# Patient Record
Sex: MALE | Race: WHITE | HISPANIC OR LATINO | Employment: OTHER | URBAN - METROPOLITAN AREA
[De-identification: names, ages, dates, MRNs, and addresses within clinical notes are randomized per-mention and may not be internally consistent; named-entity substitution may affect disease eponyms.]

---

## 2020-04-17 ENCOUNTER — TRANSFERRED RECORDS (OUTPATIENT)
Dept: MULTI SPECIALTY CLINIC | Facility: CLINIC | Age: 60
End: 2020-04-17

## 2020-04-17 LAB — HEMOCCULT STL QL IA: NEGATIVE

## 2020-12-04 ENCOUNTER — VIRTUAL VISIT (OUTPATIENT)
Dept: FAMILY MEDICINE | Facility: CLINIC | Age: 60
End: 2020-12-04
Payer: COMMERCIAL

## 2020-12-04 DIAGNOSIS — E78.5 HYPERLIPIDEMIA LDL GOAL <70: ICD-10-CM

## 2020-12-04 DIAGNOSIS — Z00.00 HEALTHCARE MAINTENANCE: ICD-10-CM

## 2020-12-04 DIAGNOSIS — E03.9 HYPOTHYROIDISM, UNSPECIFIED TYPE: Primary | ICD-10-CM

## 2020-12-04 PROBLEM — I25.10 CAD (CORONARY ARTERY DISEASE): Status: ACTIVE | Noted: 2020-12-04

## 2020-12-04 PROCEDURE — 99203 OFFICE O/P NEW LOW 30 MIN: CPT | Mod: 95 | Performed by: STUDENT IN AN ORGANIZED HEALTH CARE EDUCATION/TRAINING PROGRAM

## 2020-12-04 RX ORDER — CYCLOBENZAPRINE HCL 10 MG
TABLET ORAL
COMMUNITY
Start: 2020-11-05 | End: 2020-12-04

## 2020-12-04 RX ORDER — DIAZEPAM 5 MG
TABLET ORAL
COMMUNITY
Start: 2020-11-22 | End: 2021-12-06

## 2020-12-04 RX ORDER — ATORVASTATIN CALCIUM 40 MG/1
40 TABLET, FILM COATED ORAL DAILY
Qty: 60 TABLET | Refills: 0 | Status: SHIPPED | OUTPATIENT
Start: 2020-12-04 | End: 2021-02-26

## 2020-12-04 RX ORDER — ATORVASTATIN CALCIUM 80 MG/1
80 TABLET, FILM COATED ORAL
COMMUNITY
Start: 2020-05-05 | End: 2021-12-06

## 2020-12-04 RX ORDER — LEVOTHYROXINE SODIUM 112 UG/1
112 TABLET ORAL
COMMUNITY
Start: 2020-10-24 | End: 2020-12-04

## 2020-12-04 RX ORDER — LEVOTHYROXINE SODIUM 112 UG/1
112 TABLET ORAL DAILY
Qty: 90 TABLET | Refills: 3 | Status: SHIPPED | OUTPATIENT
Start: 2020-12-04 | End: 2022-02-07

## 2020-12-04 RX ORDER — ASPIRIN 81 MG/1
81 TABLET, CHEWABLE ORAL
COMMUNITY
Start: 2020-02-11 | End: 2023-03-15

## 2020-12-04 RX ORDER — IBUPROFEN 200 MG
200-400 TABLET ORAL
COMMUNITY

## 2020-12-04 SDOH — HEALTH STABILITY: MENTAL HEALTH: HOW OFTEN DO YOU HAVE 6 OR MORE DRINKS ON ONE OCCASION?: NOT ASKED

## 2020-12-04 SDOH — HEALTH STABILITY: MENTAL HEALTH: HOW MANY STANDARD DRINKS CONTAINING ALCOHOL DO YOU HAVE ON A TYPICAL DAY?: NOT ASKED

## 2020-12-04 SDOH — HEALTH STABILITY: MENTAL HEALTH: HOW OFTEN DO YOU HAVE A DRINK CONTAINING ALCOHOL?: NOT ASKED

## 2020-12-04 NOTE — PROGRESS NOTES
"Family Medicine Video Visit Note  Steve is being evaluated via a billable video visit.    Patient was verbally read the following and verbal consent was obtained.    \"Telephone visits are billed at different rates depending on your insurance coverage. During this emergency period, for some insurers they may be billed the same as an in-person visit.  Please reach out to your insurance provider with any questions.  If during the course of the call the physician/provider feels a telephone visit is not appropriate, you will not be charged for this service.\"    Name person giving consent:  Patient   Date verbal consent given:  12/4/2020  Time verbal consent given:  9:15 AM    Chief Complaint   Patient presents with     Establish Care     Pt is new to our clinic and would like to establish care today.     Medication Reconciliation     Complete.             HPI     Video Start Time: 9:18 AM    Steve presents to clinic today for the following health issues:    New Patient/Transfer of Care. Steve previously followed regularly with [facility] but is seeking a new provider due to a change in his insurance. Steve previously followed with Dermatology and Cardiology through [facility] and would like to transfer to [facility] specialty services. Steve's medical hisory is outlined below.     Medical History    Asymmetric sensorineural hearing loss (right sided)    Osteopenia (Dexa 1/2019; T score lumbar spine -0.2, L femoral neck -2.4, L hip -1.1; F/U in 3-5 yrs)    Hyperlipidemia     Hypothyroidism    Situational anxiety    Hepatitis C - s/p pegasys and rebatol tx in early 2000s    Environmental allergies    Actinic Keratosis     Surgical History    Ortho surgeries; R metacarpal fx manip (1997), Bilateral shoulder arthoscopy (1997,1999), R forearm fracture ORIF (1979,1980)     Meniscectomy, bilateral (1978,2000)     Perforated duodenal ulcer repair (1981)    Parotid mass excision (2006)     Septoturbinoplasty (2017) " "    Family History    Maternal uncle with prostate cancer    Hypothyroidism in brother and sister    No diabetes, CAD, stroke, or colon cancer    Mother had a heart valve replaced    Social History     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    Recreational drugs: >20 years sober    Smoking: quit smoking (remote 20 pack year hx)     , no children    Allergies    Seasonal allergies, Animal (cats horses)    Sulfa drugs and hydrocodone noted in chart, pt denied medication allergies     Current Outpatient Medications   Medication Sig Dispense Refill     aspirin (ASA) 81 MG chewable tablet Take 81 mg by mouth       atorvastatin (LIPITOR) 80 MG tablet Take 80 mg by mouth       diazepam (VALIUM) 5 MG tablet TAKE 1 TABLET BY MOUTH TWICE DAILY AS NEEDED FOR FLIGHT ANXIETY       levothyroxine (SYNTHROID/LEVOTHROID) 112 MCG tablet Take 112 mcg by mouth       cyclobenzaprine (FLEXERIL) 10 MG tablet TK 1 T PO TID FOR 5 DAYS       ibuprofen (ADVIL/MOTRIN) 200 MG tablet Take 200-400 mg by mouth       Allergies   Allergen Reactions     Sulfa Drugs Rash     Sulfanilamide Rash     Doctor believes he was allergic to topical ointment as a teenager ?sulfa containing        Food      PN: LW FI1: nka     Hydrocodone-Acetaminophen Other (See Comments)     No Clinical Screening - See Comments      PN: LW CM1: CONTRAST- nka Reaction :              Review of Systems:     Constitutional, HEENT, cardiovascular, pulmonary, gi and gu systems are negative, except as otherwise noted.         Physical Exam:     There were no vitals taken for this visit.  Estimated body mass index is 22.15 kg/m  as calculated from the following:    Height as of 10/12/13: 1.753 m (5' 9\").    Weight as of 10/12/13: 68 kg (150 lb).    GENERAL: Healthy, alert and no distress  EYES: Eyes grossly normal to inspection.  No discharge or erythema, or obvious scleral/conjunctival abnormalities.  RESP: No audible wheeze, cough, or visible cyanosis.  No visible retractions or increased " work of breathing.    SKIN: Visible skin clear. No significant rash, abnormal pigmentation or lesions.  NEURO: Cranial nerves grossly intact.  Mentation and speech appropriate for age.  PSYCH: Mentation appears normal, affect normal/bright, judgement and insight intact, normal speech and appearance well-groomed.    OSH results recent (6/3/20)  CHOLESTEROL,TOTAL    124   TRIGLYCERIDES  76   HDL CHOLESTEROL  69   NON-HDL CHOLESTEROL  55   CHOL/HDL RATIO           1.8     LDL CHOLESTEROL  40               Assessment and Plan   Steve was seen today for establish care and medication reconciliation. Pt history is outlined above. Chart uptaded. Pt main issues discussed include hypothyroidism (has been stable treated with 112mcg daily), which pt would like long term prescription for. Will plan to check thyroid cascade at next visit. Hyperlipidemia: given very low LDL and total cholesterol at 124, pt inquiring about lowering lipitor to 40mg. Will trial lower dose and follow up lipid panel at next visit. Pt also requesting derm referral for ongoing skin checks and sun damaged skin.     Diagnoses and all orders for this visit:    Hypothyroidism, unspecified type  -     levothyroxine (SYNTHROID/LEVOTHROID) 112 MCG tablet; Take 1 tablet (112 mcg) by mouth daily  -     Thyroid [place] ([facility]); Future    Hyperlipidemia LDL goal <70  -     atorvastatin (LIPITOR) 40 MG tablet; Take 1 tablet (40 mg) by mouth daily  -     Lipid Panel ([place]); Future    Healthcare maintenance  -     DERMATOLOGY ADULT REFERRAL; Future        Refilled medications that would be required in the next 3 months. Follow up on 1/29 for annual physical.     After Visit Information:  Patient declined AVS       No follow-ups on file.      Video-Visit Details    Type of service:  Video Visit    Video End Time (time video stopped): 9:45 AM    Originating Location (pt. Location): Home    Distant Location (provider location):  [facility]  [place]     Platform used for Video Visit: Moses Holloway MD  I precepted today with Jair Borrero MD

## 2020-12-04 NOTE — PROGRESS NOTES
Preceptor Attestation:   Patient seen and evaluated via video visit. I discussed the patient with the resident. I have verified the content of the note, which accurately reflects my assessment of the patient and the plan of care.   Supervising Physician:  Jair Borrero MD.

## 2020-12-08 ENCOUNTER — TELEPHONE (OUTPATIENT)
Dept: FAMILY MEDICINE | Facility: CLINIC | Age: 60
End: 2020-12-08

## 2020-12-08 NOTE — TELEPHONE ENCOUNTER
----- Message from Tato Holloway MD sent at 12/4/2020 10:00 AM CST -----  Regarding: Lab only  Hello,     We'd like to get Steve here scheduled for a lab only visit in the morning some time in January for fasting lipids. Future orders were placed. Please call him to schedule lab only draw if necessary.     Thank you ,   Tato

## 2021-01-08 DIAGNOSIS — E78.5 HYPERLIPIDEMIA LDL GOAL <70: ICD-10-CM

## 2021-01-08 DIAGNOSIS — Z00.00 HEALTHCARE MAINTENANCE: Primary | ICD-10-CM

## 2021-01-08 DIAGNOSIS — E03.9 HYPOTHYROIDISM, UNSPECIFIED TYPE: ICD-10-CM

## 2021-01-08 LAB
CHOLEST SERPL-MCNC: 132.6 MG/DL (ref 0–200)
CHOLEST/HDLC SERPL: 1.8 {RATIO} (ref 0–5)
HDLC SERPL-MCNC: 71.7 MG/DL
LDLC SERPL CALC-MCNC: 45 MG/DL (ref 0–129)
PSA SERPL-MCNC: 0.7 NG/ML (ref 0–4.5)
TRIGL SERPL-MCNC: 80.9 MG/DL (ref 0–150)
TSH SERPL DL<=0.05 MIU/L-ACNC: 1.66 UIU/ML (ref 0.3–5)
VLDL CHOLESTEROL: 16.2 MG/DL (ref 7–32)

## 2021-01-08 PROCEDURE — 80061 LIPID PANEL: CPT

## 2021-01-08 PROCEDURE — 36415 COLL VENOUS BLD VENIPUNCTURE: CPT

## 2021-01-08 NOTE — PROGRESS NOTES
"Add on lab today of a PSA per pt request due to \"enlarged prostate\". I called pt and had a risk/benefit discussion with him and pt decided he would like to go forward with PSA screening.    Tato Holloway MD PGY2  [facility]     "

## 2021-01-10 ENCOUNTER — HEALTH MAINTENANCE LETTER (OUTPATIENT)
Age: 61
End: 2021-01-10

## 2021-01-30 ENCOUNTER — ANCILLARY PROCEDURE (OUTPATIENT)
Dept: GENERAL RADIOLOGY | Facility: CLINIC | Age: 61
End: 2021-01-30
Attending: FAMILY MEDICINE
Payer: COMMERCIAL

## 2021-01-30 ENCOUNTER — OFFICE VISIT (OUTPATIENT)
Dept: URGENT CARE | Facility: URGENT CARE | Age: 61
End: 2021-01-30
Payer: COMMERCIAL

## 2021-01-30 VITALS
WEIGHT: 150 LBS | OXYGEN SATURATION: 98 % | SYSTOLIC BLOOD PRESSURE: 150 MMHG | DIASTOLIC BLOOD PRESSURE: 80 MMHG | HEIGHT: 69 IN | BODY MASS INDEX: 22.22 KG/M2 | HEART RATE: 73 BPM

## 2021-01-30 DIAGNOSIS — M79.671 RIGHT FOOT PAIN: Primary | ICD-10-CM

## 2021-01-30 DIAGNOSIS — Q74.2 ACCESSORY BONE OF FOOT: ICD-10-CM

## 2021-01-30 DIAGNOSIS — M79.671 RIGHT FOOT PAIN: ICD-10-CM

## 2021-01-30 PROCEDURE — 99203 OFFICE O/P NEW LOW 30 MIN: CPT | Performed by: FAMILY MEDICINE

## 2021-01-30 PROCEDURE — 73630 X-RAY EXAM OF FOOT: CPT | Mod: RT | Performed by: RADIOLOGY

## 2021-01-30 ASSESSMENT — MIFFLIN-ST. JEOR: SCORE: 1470.78

## 2021-01-30 NOTE — PROGRESS NOTES
"Subjective:   Steve Jin is a 60 year old male who presents for   Chief Complaint   Patient presents with     Urgent Care     Pt in clinic to have eval for right foot pain.     Musculoskeletal Problem       Right foot pain starting about 4 days ago-- walking his dogs he feels the pain on the right lateral foot.     No previous foot fractures.   Aggravating factors: placing weight  Rates his pain  Moderate when applying weight at rest about 4/10    No hx of plantar fasciitis    2 weeks ago jumped off a rock in [place] but there was no pain at that  Time or the days after    He's a very active individual and does cross country skiing      Hx of ankle fracture on the left side      Patient Active Problem List    Diagnosis Date Noted     Hypothyroidism, unspecified type 12/04/2020     Priority: Medium     Hyperlipidemia LDL goal <70 12/04/2020     Priority: Medium     CAD (coronary artery disease) 12/04/2020     Priority: Medium       Current Outpatient Medications   Medication     atorvastatin (LIPITOR) 40 MG tablet     atorvastatin (LIPITOR) 80 MG tablet     diazepam (VALIUM) 5 MG tablet     levothyroxine (SYNTHROID/LEVOTHROID) 112 MCG tablet     aspirin (ASA) 81 MG chewable tablet     ibuprofen (ADVIL/MOTRIN) 200 MG tablet     No current facility-administered medications for this visit.        ROS:  As above per HPI    Objective:   BP (!) 150/80   Pulse 73   Ht 1.753 m (5' 9\")   Wt 68 kg (150 lb)   SpO2 98%   BMI 22.15 kg/m  , Body mass index is 22.15 kg/m .  Gen:  NAD, appears age  HEENT: EOMI, sclera anicteric, Head normocephalic, ; nares patent; mosit mucous membranes  Neck: trachea midline, no thyromegaly  CV:  Hemodynamically stable  Pulm:  no increased work of breathing   Skin: no obvious rashes or abnormalities  Psych: Euthymic, linear thoughts, normal rate of speech   Rfoot: no swelling, tenderness along base of 5th metatarsal, 2+ DP /PT pulse, pain worsened during plantar flexion  R ankle: no swelling " or pain    Results for orders placed or performed in visit on 01/30/21   XR Foot Right G/E 3 Views     Status: None    Narrative    Examination:  XR FOOT RT G/E 3 VW    Date:  1/30/2021 3:23 PM     Clinical Information: Fifth metatarsal base pain .     Comparison: none.      Impression    Impression:    1.  Right foot negative for fracture or joint malalignment. Joint  spacing is maintained.   2.  Os peroneum is incidentally noted without evidence of fracture. If  there is concern for os peroneum syndrome, MR could further evaluate.   3.  Otherwise negative right foot.    MICHELLE GEE MD       Assessment & Plan:   Steve Jin, 60 year old male who presents with:    Right foot pain  Accessory bone of foot: os peroneum  With history of being especially active I presume this is a propel of type of injury as it is revealed to the on x-ray that he has an accessory bone in this area of the cuboid and fifth metatarsal.  Recommended rest and ice as needed along with NSAIDs if symptoms are not improving in 2 to 3 weeks return for reevaluation with sports medicine    - XR Foot Right G/E 3 Views          Nishant Maya MD   [facility]    The use of Dragon/[employer] dictation services may have been used to construct the content in this note; any grammatical or spelling errors are non-intentional. Please contact the author of this note directly if you are in need of any clarification.

## 2021-01-30 NOTE — PATIENT INSTRUCTIONS
If this is indeed 'Os Peroneum' syndrome then a period of rest and reduction in activities will reduce the discomfort      For flare-ups use ice to the area, taking ibuprofen 400-600mg every 4-6 hours      Call [phone] to schedule an appointment with the Sports medicine specialists if things haven't improved in the next 2-3 weeks

## 2021-02-05 ENCOUNTER — OFFICE VISIT (OUTPATIENT)
Dept: URGENT CARE | Facility: URGENT CARE | Age: 61
End: 2021-02-05
Payer: COMMERCIAL

## 2021-02-05 VITALS
TEMPERATURE: 98.4 F | DIASTOLIC BLOOD PRESSURE: 60 MMHG | OXYGEN SATURATION: 99 % | BODY MASS INDEX: 22.22 KG/M2 | HEART RATE: 71 BPM | WEIGHT: 150 LBS | HEIGHT: 69 IN | SYSTOLIC BLOOD PRESSURE: 122 MMHG

## 2021-02-05 DIAGNOSIS — M77.51 OS PERONEUM SYNDROME OF RIGHT FOOT: Primary | ICD-10-CM

## 2021-02-05 PROCEDURE — 99213 OFFICE O/P EST LOW 20 MIN: CPT | Performed by: FAMILY MEDICINE

## 2021-02-05 RX ORDER — METHYLPREDNISOLONE 4 MG
TABLET, DOSE PACK ORAL
Qty: 21 TABLET | Refills: 0 | Status: SHIPPED | OUTPATIENT
Start: 2021-02-05 | End: 2021-12-06

## 2021-02-05 RX ORDER — OXYCODONE AND ACETAMINOPHEN 5; 325 MG/1; MG/1
10 TABLET ORAL EVERY 6 HOURS PRN
Qty: 12 TABLET | Refills: 0 | Status: SHIPPED | OUTPATIENT
Start: 2021-02-05 | End: 2021-02-08

## 2021-02-05 ASSESSMENT — MIFFLIN-ST. JEOR: SCORE: 1470.78

## 2021-02-06 NOTE — PROGRESS NOTES
Subjective: 2 weeks ago, see previous note, he developed pain after wearing a new pair of shoes, was found to have an os perineum and that is right where his pain is so presumably this is os peroneum syndrome, but he just cannot get any relief, cannot sleep at night, cannot really stay off of it because he has things he has to do, not sure how to make it through the next few days while he is waiting to get into see orthopedics or podiatry.    Objective: He indeed has a lot of pain over the lateral foot right where the os peroneum is found.  It is the same on the left side, just not painful.    Assessment and plan: Os peroneum syndrome.  We discussed options.  I gave him a course of steroids to take since he will not be able to get into see podiatry till next week and a prescription for 8 Percocet.  He can also try capsaicin which I think may be very helpful.

## 2021-02-08 ENCOUNTER — VIRTUAL VISIT (OUTPATIENT)
Dept: FAMILY MEDICINE | Facility: CLINIC | Age: 61
End: 2021-02-08
Payer: COMMERCIAL

## 2021-02-08 ENCOUNTER — RECORDS - HEALTHEAST (OUTPATIENT)
Dept: ADMINISTRATIVE | Facility: OTHER | Age: 61
End: 2021-02-08

## 2021-02-08 DIAGNOSIS — M79.671 RIGHT FOOT PAIN: ICD-10-CM

## 2021-02-08 DIAGNOSIS — M77.51 OS PERONEUM SYNDROME OF RIGHT FOOT: Primary | ICD-10-CM

## 2021-02-08 PROCEDURE — 99213 OFFICE O/P EST LOW 20 MIN: CPT | Mod: 95 | Performed by: STUDENT IN AN ORGANIZED HEALTH CARE EDUCATION/TRAINING PROGRAM

## 2021-02-08 RX ORDER — OXYCODONE AND ACETAMINOPHEN 5; 325 MG/1; MG/1
1 TABLET ORAL EVERY 6 HOURS PRN
Qty: 12 TABLET | Refills: 0 | Status: SHIPPED | OUTPATIENT
Start: 2021-02-08 | End: 2021-02-12

## 2021-02-08 NOTE — PROGRESS NOTES
Family Medicine Telephone Visit Note    Chief Complaint   Patient presents with     Refill Request     refill meds     Musculoskeletal Problem     right foot pain.for the past 2 weeks. wants a referral              HPI   Patients name: Steve  Appointment start time:  4:08 PM    Patient seen via phone visit today for ongoing right foot pain.  Has been seen in urgent care twice for significant pain.  Notes that he has broke his ankle before, this pain is significantly worse.  Has difficult time ambulating due to the pain.  No injury that he knows of; has been wearing new shoes.  He is a very active individual normally.  He was prescribed Percocet at urgent care, which has helped with the pain.  Was also prescribed prednisone which is not helped.  He is also been icing the right foot.  He did have a right foot x-ray which did show os peroneum of the right foot, which does correspond to his pain which is at the base of the fifth right metatarsal.      Current Outpatient Medications   Medication Sig Dispense Refill     aspirin (ASA) 81 MG chewable tablet Take 81 mg by mouth       atorvastatin (LIPITOR) 40 MG tablet Take 1 tablet (40 mg) by mouth daily 60 tablet 0     atorvastatin (LIPITOR) 80 MG tablet Take 80 mg by mouth       diazepam (VALIUM) 5 MG tablet TAKE 1 TABLET BY MOUTH TWICE DAILY AS NEEDED FOR FLIGHT ANXIETY       ibuprofen (ADVIL/MOTRIN) 200 MG tablet Take 200-400 mg by mouth       levothyroxine (SYNTHROID/LEVOTHROID) 112 MCG tablet Take 1 tablet (112 mcg) by mouth daily 90 tablet 3     methylPREDNISolone (MEDROL DOSEPAK) 4 MG tablet therapy pack Follow Package Directions 21 tablet 0     oxyCODONE-acetaminophen (PERCOCET) 5-325 MG tablet Take 10 tablets by mouth every 6 hours as needed for pain 12 tablet 0     Allergies   Allergen Reactions     Sulfa Drugs Rash     Sulfanilamide Rash     Doctor believes he was allergic to topical ointment as a teenager ?sulfa containing        Hydrocodone-Acetaminophen  "Other (See Comments)     No Clinical Screening - See Comments      PN: LW CM1: CONTRAST- nka Reaction :              Review of Systems:     Constitutional, HEENT, cardiovascular, pulmonary, GI, , musculoskeletal, neuro, skin, endocrine and psych systems are negative, except as otherwise noted.         Physical Exam:     There were no vitals taken for this visit.  Estimated body mass index is 22.15 kg/m  as calculated from the following:    Height as of 2/5/21: 1.753 m (5' 9\").    Weight as of 2/5/21: 68 kg (150 lb).    Exam:  Constitutional: healthy, alert and no distress  Psychiatric: mentation appears normal and affect normal/bright  This is a telephone visit, therefore cannot perform musculoskeletal exam    Reviewed imaging from urgent care. Os peroneum present.  Radiologist recommends MRI follow-up if concern for os peroneum syndrome.        Assessment and Plan   Steve was seen today for refill request and musculoskeletal problem.    Diagnoses and all orders for this visit:    Os peroneum syndrome of right foot  Right foot pain  Patient reports exquisite right lateral foot pain that does correspond with os peroneum noted on urgent care x-ray.  No trauma history related to this pain.  Percocet helps take the edge off, but he still in a significant amount of pain.  Does ask for short course of Percocet to be reordered.  Did concern for os peroneum syndrome, will obtain right foot MRI and get patient into be seen by podiatry.  13 tablets of Percocet prescribed.  -     MR Foot Right w/o Contrast; Future  -     Orthopedic & Spine  Referral; Future  -     oxyCODONE-acetaminophen (PERCOCET) 5-325 MG tablet; Take 1 tablet by mouth every 6 hours as needed for pain    Refilled medications that would be required in the next 3 months.     After Visit Information:  Patient chose to view AVS via [employer]    No follow-ups on file.    Appointment end time: 11:27 AM  This is a telephone visit that took 4:20 " minutes.      Clinician location:  [facility]     Marcello Young MD  I precepted today with Dr. Moy.

## 2021-02-08 NOTE — PROGRESS NOTES
Preceptor Attestation:    I talked to the patient on the phone and discussed the patient with the resident. I have verified the content of the note, which accurately reflects my assessment of the patient and the plan of care.   Supervising Physician:  Won Moy MD.

## 2021-02-12 ENCOUNTER — TELEPHONE (OUTPATIENT)
Dept: FAMILY MEDICINE | Facility: CLINIC | Age: 61
End: 2021-02-12

## 2021-02-12 ENCOUNTER — VIRTUAL VISIT (OUTPATIENT)
Dept: FAMILY MEDICINE | Facility: CLINIC | Age: 61
End: 2021-02-12
Payer: COMMERCIAL

## 2021-02-12 DIAGNOSIS — M79.671 RIGHT FOOT PAIN: ICD-10-CM

## 2021-02-12 DIAGNOSIS — M77.51 OS PERONEUM SYNDROME OF RIGHT FOOT: ICD-10-CM

## 2021-02-12 PROCEDURE — 99213 OFFICE O/P EST LOW 20 MIN: CPT | Mod: 95 | Performed by: STUDENT IN AN ORGANIZED HEALTH CARE EDUCATION/TRAINING PROGRAM

## 2021-02-12 RX ORDER — OXYCODONE AND ACETAMINOPHEN 5; 325 MG/1; MG/1
1 TABLET ORAL EVERY 6 HOURS PRN
Qty: 12 TABLET | Refills: 0 | Status: SHIPPED | OUTPATIENT
Start: 2021-02-12 | End: 2021-12-06

## 2021-02-12 NOTE — TELEPHONE ENCOUNTER
He is calling back Austin to let him know yes he would like some more pain meds to last him till next Friday when he has his appointment

## 2021-02-12 NOTE — PROGRESS NOTES
Family Medicine Telephone Visit Note    Chief Complaint   Patient presents with     Refill Request     oxyCODONE-acetaminophen (PERCOCET) 5-325 MG tablet            HPI   Patients name: Steve  Appointment start time:  2:00 PM    Patient seen via phone visit for ongoing R foot pain which I suspect is due to os peroneum syndrome of the right foot (see note from 2/8/12). Has MRI set up for 2/16/21 and podiatry appointment on 2/19/21. Has been using the percocet 1 tablet 3-4 times daily; has also been taking advil. No changes in the quality of the pain. Some minor swelling that has been waxing/waning and mostly stable over the course of the last couple of weeks.       Current Outpatient Medications   Medication Sig Dispense Refill     aspirin (ASA) 81 MG chewable tablet Take 81 mg by mouth       atorvastatin (LIPITOR) 40 MG tablet Take 1 tablet (40 mg) by mouth daily 60 tablet 0     atorvastatin (LIPITOR) 80 MG tablet Take 80 mg by mouth       ibuprofen (ADVIL/MOTRIN) 200 MG tablet Take 200-400 mg by mouth       levothyroxine (SYNTHROID/LEVOTHROID) 112 MCG tablet Take 1 tablet (112 mcg) by mouth daily 90 tablet 3     oxyCODONE-acetaminophen (PERCOCET) 5-325 MG tablet Take 1 tablet by mouth every 6 hours as needed for pain 12 tablet 0     diazepam (VALIUM) 5 MG tablet TAKE 1 TABLET BY MOUTH TWICE DAILY AS NEEDED FOR FLIGHT ANXIETY       methylPREDNISolone (MEDROL DOSEPAK) 4 MG tablet therapy pack Follow Package Directions (Patient not taking: Reported on 2/12/2021) 21 tablet 0     Allergies   Allergen Reactions     Sulfa Drugs Rash     Sulfanilamide Rash     Doctor believes he was allergic to topical ointment as a teenager ?sulfa containing        Hydrocodone-Acetaminophen Other (See Comments)     No Clinical Screening - See Comments      PN: LW CM1: CONTRAST- nka Reaction :              Review of Systems:     Constitutional, HEENT, cardiovascular, pulmonary, GI, , musculoskeletal, neuro, skin, endocrine and psych  "systems are negative, except as otherwise noted.         Physical Exam:     There were no vitals taken for this visit.  Estimated body mass index is 22.15 kg/m  as calculated from the following:    Height as of 2/5/21: 1.753 m (5' 9\").    Weight as of 2/5/21: 68 kg (150 lb).    Exam:  Constitutional: healthy, alert and no distress  Resp: speaking in full sentences. No cough or wheeze  Psychiatric: mentation appears normal and affect normal/bright     query: appropriate         Assessment and Plan   Steve was seen today for refill request.    Diagnoses and all orders for this visit:    Os peroneum syndrome of right foot  Right foot pain  Suspected os peroneum syndrome as cause of R foot pain. He has both MRI and podiatry visit scheduled, but not until next week. Patient expressed knowledge of risk of addiction.  query was appropriate. Certainly reasonable to prescribe short course of percocet to get him to podiatry visit. Also recommended he continue Advil.   -     oxyCODONE-acetaminophen (PERCOCET) 5-325 MG tablet; Take 1 tablet by mouth every 6 hours as needed for pain    After Visit Information:  Patient chose to view AVS via [employer]    Appointment end time: 2:11 PM  This is a telephone visit that took 11 minutes.      Clinician location:  [facility]     Marcello Young MD  I precepted today with Dr. Zamora.            "

## 2021-02-12 NOTE — TELEPHONE ENCOUNTER
Spoke with the pt and put him on Dr. Young's schedule today for a 2:10PM telephone visit.    Silverio Phipps on 2/12/2021 at 2:05 PM

## 2021-02-13 NOTE — PROGRESS NOTES
Preceptor attestation:    I discussed the patient with the resident, and I spoke to the patient by telephone. I have verified the content of the note, which accurately reflects my assessment of the patient and the plan of care.    Supervising physician: Elvira Zamora MD  [facility]

## 2021-02-16 ENCOUNTER — HOSPITAL ENCOUNTER (OUTPATIENT)
Dept: MRI IMAGING | Facility: CLINIC | Age: 61
Discharge: HOME | End: 2021-02-16

## 2021-02-16 DIAGNOSIS — M77.51 OS PERONEUM SYNDROME, RIGHT: ICD-10-CM

## 2021-02-19 ENCOUNTER — OFFICE VISIT (OUTPATIENT)
Dept: PODIATRY | Facility: CLINIC | Age: 61
End: 2021-02-19
Payer: COMMERCIAL

## 2021-02-19 ENCOUNTER — ANCILLARY PROCEDURE (OUTPATIENT)
Dept: GENERAL RADIOLOGY | Facility: CLINIC | Age: 61
End: 2021-02-19
Attending: PODIATRIST
Payer: COMMERCIAL

## 2021-02-19 VITALS
SYSTOLIC BLOOD PRESSURE: 122 MMHG | HEART RATE: 60 BPM | BODY MASS INDEX: 22.15 KG/M2 | DIASTOLIC BLOOD PRESSURE: 70 MMHG | WEIGHT: 150 LBS

## 2021-02-19 DIAGNOSIS — S92.354A CLOSED NONDISPLACED FRACTURE OF FIFTH METATARSAL BONE OF RIGHT FOOT, INITIAL ENCOUNTER: ICD-10-CM

## 2021-02-19 DIAGNOSIS — S92.354A CLOSED NONDISPLACED FRACTURE OF FIFTH METATARSAL BONE OF RIGHT FOOT, INITIAL ENCOUNTER: Primary | ICD-10-CM

## 2021-02-19 PROBLEM — M67.921 BICEPS TENDINOPATHY OF RIGHT UPPER EXTREMITY: Status: ACTIVE | Noted: 2018-01-16

## 2021-02-19 PROBLEM — M85.80 LOW BONE MASS: Status: ACTIVE | Noted: 2019-02-03

## 2021-02-19 PROBLEM — M17.12 PRIMARY OSTEOARTHRITIS OF LEFT KNEE: Status: ACTIVE | Noted: 2018-06-12

## 2021-02-19 PROBLEM — F41.8 SITUATIONAL ANXIETY: Status: ACTIVE | Noted: 2017-03-07

## 2021-02-19 PROBLEM — J34.2 NASAL SEPTAL DEVIATION: Status: ACTIVE | Noted: 2021-02-19

## 2021-02-19 PROBLEM — J34.3 HYPERTROPHY OF INFERIOR NASAL TURBINATE: Status: ACTIVE | Noted: 2021-02-19

## 2021-02-19 PROBLEM — Z91.09 ENVIRONMENTAL ALLERGIES: Status: ACTIVE | Noted: 2021-02-19

## 2021-02-19 PROBLEM — J32.9 CHRONIC SINUSITIS: Status: ACTIVE | Noted: 2021-02-19

## 2021-02-19 PROBLEM — S82.832A CLOSED FRACTURE OF LEFT DISTAL FIBULA: Status: ACTIVE | Noted: 2018-02-06

## 2021-02-19 PROBLEM — R55 SYNCOPE AND COLLAPSE: Status: ACTIVE | Noted: 2020-02-11

## 2021-02-19 PROBLEM — S43.431A TEAR OF RIGHT GLENOID LABRUM: Status: ACTIVE | Noted: 2018-01-16

## 2021-02-19 PROBLEM — M67.919 TENDINOPATHY OF ROTATOR CUFF: Status: ACTIVE | Noted: 2018-01-16

## 2021-02-19 PROCEDURE — 28470 CLTX METATARSAL FX WO MNP EA: CPT | Mod: RT | Performed by: PODIATRIST

## 2021-02-19 PROCEDURE — 99203 OFFICE O/P NEW LOW 30 MIN: CPT | Mod: 57 | Performed by: PODIATRIST

## 2021-02-19 PROCEDURE — 73630 X-RAY EXAM OF FOOT: CPT | Mod: RT | Performed by: RADIOLOGY

## 2021-02-19 NOTE — PATIENT INSTRUCTIONS
We wish you continued good healing. If you have any questions or concerns, please do not hesitate to contact us at [phone]    Send [employer] (secure e-mail communication and access to your chart) to send a message or to make an appointment.    Please remember to call and schedule a follow up appointment if one was recommended at your earliest convenience.     +++OF MARCH 2020+++ LOCATION AND HOURS HAVE CHANGED    PLEASE CALL CLINICS TO VERIFY DAYS AND TIMES  PODIATRY CLINIC HOURS  TELEPHONE NUMBER    Dr. Lucien ALEGRIAPKIMBERLY [facility]        Clinics:  [facility]   Tuesday 1PM-6PM  [place]  Wednesday 745AM-330PM  [place]  Thursday/Friday 745AM-230PM  [place] [facility] APPOINTMENTS  [phone]    [place] APPOINTMENTS  [phone]          If you need a medication refill, please contact us you may need lab work and/or a follow up visit prior to your refill (i.e. Antifungal medications).    If MRI needed please call Imaging at [phone] or [phone]    HOW DO I GET MY KNEE SCOOTER? Knee scooters can be picked up at ANY Medical Supply stores with your knee scooter Prescription.  OR    Bring your signed prescription to an [facility] showroom.

## 2021-02-19 NOTE — LETTER
2/19/2021         RE: Steve Jin  [address]  [place] [address]        Dear Colleague,    Thank you for referring your patient, Steve Jin, to the [facility]. Please see a copy of my visit note below.    Subjective:    Pt is seen today in consult form Dr. Young for 5th metatarsal pain. Has had this for 1 month. Has had slight swelling. Has pain which is aggravated activity and relieved by rest. He denies any history of trauma. He can remember jumping 3 feet down but didn't have any significant pain or swelling after this. He only does low impact exercising. He states he has a history of osteopenia. Has history of right ankle fracture which was treated conservatively. He quit smoking 20 years ago.  He works as a sitdown job.      ROS:  A 10-point review of systems was performed and is positive for that noted in the HPI and as seen above.  All other areas are negative.          Allergies   Allergen Reactions     Sulfa Drugs Rash     Sulfanilamide Rash     Doctor believes he was allergic to topical ointment as a teenager ?sulfa containing        Hydrocodone-Acetaminophen Other (See Comments)     No Clinical Screening - See Comments      PN: LW CM1: CONTRAST- nka Reaction :       Current Outpatient Medications   Medication Sig Dispense Refill     aspirin (ASA) 81 MG chewable tablet Take 81 mg by mouth       atorvastatin (LIPITOR) 40 MG tablet Take 1 tablet (40 mg) by mouth daily 60 tablet 0     atorvastatin (LIPITOR) 80 MG tablet Take 80 mg by mouth       diazepam (VALIUM) 5 MG tablet TAKE 1 TABLET BY MOUTH TWICE DAILY AS NEEDED FOR FLIGHT ANXIETY       ibuprofen (ADVIL/MOTRIN) 200 MG tablet Take 200-400 mg by mouth       levothyroxine (SYNTHROID/LEVOTHROID) 112 MCG tablet Take 1 tablet (112 mcg) by mouth daily 90 tablet 3     methylPREDNISolone (MEDROL DOSEPAK) 4 MG tablet therapy pack Follow Package Directions (Patient not taking: Reported on 2/12/2021) 21 tablet 0      oxyCODONE-acetaminophen (PERCOCET) 5-325 MG tablet Take 1 tablet by mouth every 6 hours as needed for pain 12 tablet 0       Patient Active Problem List   Diagnosis     Hypothyroidism, unspecified type     Hyperlipidemia LDL goal <70     CAD (coronary artery disease)     Tendinopathy of rotator cuff     Tear of right glenoid labrum     Syncope and collapse     Subjective tinnitus of right ear     Situational anxiety     Sensorineural hearing loss, asymmetrical     Sebaceous cyst     Primary osteoarthritis of left knee     Personal history of alcoholism (H)     Other and unspecified special symptom or syndrome, not elsewhere classified     Nasal septal deviation     Low bone mass     Hypertrophy of inferior nasal turbinate     Hearing loss in right ear     Environmental allergies     Closed fracture of left distal fibula     Chronic sinusitis     Biceps tendinopathy of right upper extremity     Backache       Past Medical History:   Diagnosis Date     Actinic keratosis      Hepatitis C     S/p pegasys and rebatol tx in early 2000s     Hyperlipidemia LDL goal <70      Hypothyroidism      Osteopenia      Sensorineural hearing loss, unilateral     Right ear     Situational anxiety     Flying       Past Surgical History:   Procedure Laterality Date     ARTHROSCOPY KNEE WITH MENISCECTOMY Bilateral      OPEN REDUCTION INTERNAL FIXATION FOREARM Right      Septoplasty       SURGICAL PATHOLOGY EXAM      Parotid mass excision       Family History   Problem Relation Age of Onset     Hypothyroidism Sister      Hypothyroidism Brother      Cerebrovascular Disease No family hx of      Diabetes No family hx of      Colon Cancer No family hx of        Social History     Tobacco Use     Smoking status: Former Smoker     Packs/day: 1.00     Years: 20.00     Pack years: 20.00     Types: Cigarettes     Smokeless tobacco: Never Used   Substance Use Topics     Alcohol use: Not Currently         Exam:    Vitals: /70   Pulse 60    BMI: There is no height or weight on file to calculate BMI.  Height: Data Unavailable    Constitutional/ general:  Pt is in no apparent distress, appears well-nourished.  Cooperative with history and physical exam.     Psych:  The patient answered questions appropriately.  Normal affect.  Seems to have reasonable expectations, in terms of treatment.     Eyes:  Visual scanning/ tracking without deficit.     Ears:  Response to auditory stimuli is normal.  negative hearing aid devices.  Auricles in proper alignment.     Lymphatic:  Popliteal lymph nodes not enlarged.     Lungs:  Non labored breathing, non labored speech. No cough.  No audible wheezing. Even, quiet breathing.       Vascular:  positive pedal pulses bilaterally for both the DP and PT arteries.  CFT < 3 sec.  negative ankle edema.  positive pedal hair growth.    Neuro:  Alert and oriented x 3. Coordinated gait.  Light touch sensation is intact to the L4, L5, S1 distributions. No obvious deficits.  No evidence of neurological-based weakness, spasticity, or contracture in the lower extremities.     Derm: Normal texture and turgor.  No erythema, ecchymosis, or cyanosis.      Musculoskeletal:    Lower extremity muscle strength is normal.  Patient is ambulatory without an assistive device or brace.  No gross deformities.  Cavus arch.  Pain upon palpation  of right 5th metatarsal. Patient has pain with loading the fifth metatarsal head. Just slight edema noted. No ecchymosis.  No erythema.  No sign of ulceration, infection, or drainage. No pain with stressing peroneus longus or peroneus brevis. No pain on palpation of the sinus.    Radiographic Exam:  X-Ray Findings:    1.  Nondisplaced fracture in the proximal metadiaphysis of the fifth metatarsal.  2.  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  3.  Otherwise negative.   Result Narrative   EXAM: MR ANKLE WO CONTRAST RIGHT  LOCATION: [facility]  DATE/TIME: 2/16/2021  11:06 PM    INDICATION: Ankle pain.  COMPARISON: Radiographs from 3/18/2012.  TECHNIQUE: Unenhanced.    FINDINGS:     TENDONS:   Peroneal tendons intact. No tendinopathy. No significant tendon sheath fluid.  Medial tendons intact. No tendinopathy. No significant tendon sheath fluid.  Anterior tendons intact. No tendinopathy. No significant tendon sheath fluid.  Achilles tendon intact. No tendinopathy or paratenonitis.    LIGAMENTS:   Anterior talofibular ligament intact. Calcaneofibular ligament intact. Posterior talofibular ligament intact.  Syndesmotic inferior tibiofibular ligaments are intact.  Deltoid ligament complex intact.    JOINTS AND BONES: There is a nondisplaced fracture in the proximal metadiaphysis of the fifth metatarsal. Adjacent marrow edema indicates that this is acute/subacute. There is a nonosseous subtalar coalition at the middle facet. Moderate adjacent marrow   edema. The bones appear normal otherwise. No chondromalacia. No effusion or synovitis. No intra-articular body.    SOFT TISSUES: Plantar fascia intact. No acute fasciitis. Sinus tarsi and tarsal tunnel are normal. No hematoma, cyst or mass.     X-ray today shows Najera fracture with 1 mm gap laterally.    Assessment:  5th metatarsal fracture right foot    Plan: Reviewed recent MRI results. X-rays taken today of right foot.  Explained to patient that unfortunately he has Najera fracture in his fifth metatarsal.  Discussed that this fracture can be very slow to heal and that compliance is very important.  Recommend nonweightbearing at all times.  We gave the patient crutches.  We gave him a prescription for a knee walker.  We discussed a handicap parking sticker but he declines at this time.  We gave him a plantarflexed Cam walker.  We discussed this is only to protect it and not for him to walk on.  The patient has history of osteopiña.  He is taking calcium.  Discussed if not healing with conservative treatments may need surgery.  Return  to the clinic in 3 weeks for repeat x-ray.  Fracture care.  Thank you for allowing me participate in the care of this patient.          Lucien Ham DPM DPM, FACFAS        Again, thank you for allowing me to participate in the care of your patient.        Sincerely,        Lucien Ham DPM

## 2021-02-19 NOTE — PROGRESS NOTES
Subjective:    Pt is seen today in consult form Dr. Young for 5th metatarsal pain. Has had this for 1 month. Has had slight swelling. Has pain which is aggravated activity and relieved by rest. He denies any history of trauma. He can remember jumping 3 feet down but didn't have any significant pain or swelling after this. He only does low impact exercising. He states he has a history of osteopenia. Has history of right ankle fracture which was treated conservatively. He quit smoking 20 years ago.  He works as a sitdown job.      ROS:  A 10-point review of systems was performed and is positive for that noted in the HPI and as seen above.  All other areas are negative.          Allergies   Allergen Reactions     Sulfa Drugs Rash     Sulfanilamide Rash     Doctor believes he was allergic to topical ointment as a teenager ?sulfa containing        Hydrocodone-Acetaminophen Other (See Comments)     No Clinical Screening - See Comments      PN: LW CM1: CONTRAST- nka Reaction :       Current Outpatient Medications   Medication Sig Dispense Refill     aspirin (ASA) 81 MG chewable tablet Take 81 mg by mouth       atorvastatin (LIPITOR) 40 MG tablet Take 1 tablet (40 mg) by mouth daily 60 tablet 0     atorvastatin (LIPITOR) 80 MG tablet Take 80 mg by mouth       diazepam (VALIUM) 5 MG tablet TAKE 1 TABLET BY MOUTH TWICE DAILY AS NEEDED FOR FLIGHT ANXIETY       ibuprofen (ADVIL/MOTRIN) 200 MG tablet Take 200-400 mg by mouth       levothyroxine (SYNTHROID/LEVOTHROID) 112 MCG tablet Take 1 tablet (112 mcg) by mouth daily 90 tablet 3     methylPREDNISolone (MEDROL DOSEPAK) 4 MG tablet therapy pack Follow Package Directions (Patient not taking: Reported on 2/12/2021) 21 tablet 0     oxyCODONE-acetaminophen (PERCOCET) 5-325 MG tablet Take 1 tablet by mouth every 6 hours as needed for pain 12 tablet 0       Patient Active Problem List   Diagnosis     Hypothyroidism, unspecified type     Hyperlipidemia LDL goal <70     CAD (coronary  artery disease)     Tendinopathy of rotator cuff     Tear of right glenoid labrum     Syncope and collapse     Subjective tinnitus of right ear     Situational anxiety     Sensorineural hearing loss, asymmetrical     Sebaceous cyst     Primary osteoarthritis of left knee     Personal history of alcoholism (H)     Other and unspecified special symptom or syndrome, not elsewhere classified     Nasal septal deviation     Low bone mass     Hypertrophy of inferior nasal turbinate     Hearing loss in right ear     Environmental allergies     Closed fracture of left distal fibula     Chronic sinusitis     Biceps tendinopathy of right upper extremity     Backache       Past Medical History:   Diagnosis Date     Actinic keratosis      Hepatitis C     S/p pegasys and rebatol tx in early 2000s     Hyperlipidemia LDL goal <70      Hypothyroidism      Osteopenia      Sensorineural hearing loss, unilateral     Right ear     Situational anxiety     Flying       Past Surgical History:   Procedure Laterality Date     ARTHROSCOPY KNEE WITH MENISCECTOMY Bilateral      OPEN REDUCTION INTERNAL FIXATION FOREARM Right      Septoplasty       SURGICAL PATHOLOGY EXAM      Parotid mass excision       Family History   Problem Relation Age of Onset     Hypothyroidism Sister      Hypothyroidism Brother      Cerebrovascular Disease No family hx of      Diabetes No family hx of      Colon Cancer No family hx of        Social History     Tobacco Use     Smoking status: Former Smoker     Packs/day: 1.00     Years: 20.00     Pack years: 20.00     Types: Cigarettes     Smokeless tobacco: Never Used   Substance Use Topics     Alcohol use: Not Currently         Exam:    Vitals: /70   Pulse 60   BMI: There is no height or weight on file to calculate BMI.  Height: Data Unavailable    Constitutional/ general:  Pt is in no apparent distress, appears well-nourished.  Cooperative with history and physical exam.     Psych:  The patient answered  questions appropriately.  Normal affect.  Seems to have reasonable expectations, in terms of treatment.     Eyes:  Visual scanning/ tracking without deficit.     Ears:  Response to auditory stimuli is normal.  negative hearing aid devices.  Auricles in proper alignment.     Lymphatic:  Popliteal lymph nodes not enlarged.     Lungs:  Non labored breathing, non labored speech. No cough.  No audible wheezing. Even, quiet breathing.       Vascular:  positive pedal pulses bilaterally for both the DP and PT arteries.  CFT < 3 sec.  negative ankle edema.  positive pedal hair growth.    Neuro:  Alert and oriented x 3. Coordinated gait.  Light touch sensation is intact to the L4, L5, S1 distributions. No obvious deficits.  No evidence of neurological-based weakness, spasticity, or contracture in the lower extremities.     Derm: Normal texture and turgor.  No erythema, ecchymosis, or cyanosis.      Musculoskeletal:    Lower extremity muscle strength is normal.  Patient is ambulatory without an assistive device or brace.  No gross deformities.  Cavus arch.  Pain upon palpation  of right 5th metatarsal. Patient has pain with loading the fifth metatarsal head. Just slight edema noted. No ecchymosis.  No erythema.  No sign of ulceration, infection, or drainage. No pain with stressing peroneus longus or peroneus brevis. No pain on palpation of the sinus.    Radiographic Exam:  X-Ray Findings:    1.  Nondisplaced fracture in the proximal metadiaphysis of the fifth metatarsal.  2.  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  3.  Otherwise negative.   Result Narrative   EXAM: MR ANKLE WO CONTRAST RIGHT  LOCATION: [facility]  DATE/TIME: 2/16/2021 11:06 PM    INDICATION: Ankle pain.  COMPARISON: Radiographs from 3/18/2012.  TECHNIQUE: Unenhanced.    FINDINGS:     TENDONS:   Peroneal tendons intact. No tendinopathy. No significant tendon sheath fluid.  Medial tendons intact. No tendinopathy. No  significant tendon sheath fluid.  Anterior tendons intact. No tendinopathy. No significant tendon sheath fluid.  Achilles tendon intact. No tendinopathy or paratenonitis.    LIGAMENTS:   Anterior talofibular ligament intact. Calcaneofibular ligament intact. Posterior talofibular ligament intact.  Syndesmotic inferior tibiofibular ligaments are intact.  Deltoid ligament complex intact.    JOINTS AND BONES: There is a nondisplaced fracture in the proximal metadiaphysis of the fifth metatarsal. Adjacent marrow edema indicates that this is acute/subacute. There is a nonosseous subtalar coalition at the middle facet. Moderate adjacent marrow   edema. The bones appear normal otherwise. No chondromalacia. No effusion or synovitis. No intra-articular body.    SOFT TISSUES: Plantar fascia intact. No acute fasciitis. Sinus tarsi and tarsal tunnel are normal. No hematoma, cyst or mass.     X-ray today shows Najera fracture with 1 mm gap laterally.    Assessment:  5th metatarsal fracture right foot    Plan: Reviewed recent MRI results. X-rays taken today of right foot.  Explained to patient that unfortunately he has Najera fracture in his fifth metatarsal.  Discussed that this fracture can be very slow to heal and that compliance is very important.  Recommend nonweightbearing at all times.  We gave the patient crutches.  We gave him a prescription for a knee walker.  We discussed a handicap parking sticker but he declines at this time.  We gave him a plantarflexed Cam walker.  We discussed this is only to protect it and not for him to walk on.  The patient has history of osteopiña.  He is taking calcium.  Discussed if not healing with conservative treatments may need surgery.  Return to the clinic in 3 weeks for repeat x-ray.  Fracture care.  Thank you for allowing me participate in the care of this patient.          Lucien Ham, IRMA DPM, FACFAS

## 2021-02-26 ENCOUNTER — OFFICE VISIT (OUTPATIENT)
Dept: FAMILY MEDICINE | Facility: CLINIC | Age: 61
End: 2021-02-26
Payer: COMMERCIAL

## 2021-02-26 VITALS
SYSTOLIC BLOOD PRESSURE: 111 MMHG | OXYGEN SATURATION: 96 % | DIASTOLIC BLOOD PRESSURE: 69 MMHG | HEART RATE: 65 BPM | RESPIRATION RATE: 12 BRPM | TEMPERATURE: 98.2 F

## 2021-02-26 DIAGNOSIS — Z01.818 PREOP GENERAL PHYSICAL EXAM: Primary | ICD-10-CM

## 2021-02-26 DIAGNOSIS — E78.5 HYPERLIPIDEMIA LDL GOAL <70: ICD-10-CM

## 2021-02-26 PROCEDURE — 99214 OFFICE O/P EST MOD 30 MIN: CPT | Mod: GC | Performed by: STUDENT IN AN ORGANIZED HEALTH CARE EDUCATION/TRAINING PROGRAM

## 2021-02-26 RX ORDER — ATORVASTATIN CALCIUM 40 MG/1
TABLET, FILM COATED ORAL
Qty: 60 TABLET | Refills: 0 | Status: SHIPPED | OUTPATIENT
Start: 2021-02-26 | End: 2021-06-14

## 2021-02-26 NOTE — PROGRESS NOTES
[facility] [address] [place] [address]  Phone: [phone]  Fax: [phone]  Primary Provider: Tato Holloway  Pre-op Performing Provider: MAILE GREEN      PREOPERATIVE EVALUATION:  Today's date: 2/26/2021    Steve Jin is a 60 year old male who presents for a preoperative evaluation.    Surgical Information:  Surgery/Procedure: R foot 5th metatarsal plate  Surgery Location: [facility]  Surgeon: Dr. Cervantes  Surgery Date: 3/1/2021  Time of Surgery: 2pm  Where patient plans to recover: At home with family  Fax number for surgical facility: [phone]    Type of Anesthesia Anticipated: to be determined    Assessment & Plan     The proposed surgical procedure is considered INTERMEDIATE risk.    Preop general physical exam  Patient is not taking medications that require holding prior to surgery. Extensively counseled him not to take medications that have blood thinning properties, such as Asprin prior to surgery. Counseled him to take his Lipitor and synthroid as prescribed. Though he has hyperlipidemia, last labs were ~1.5 months ago, and reflect good control of cholesterol. TSH is also WNL recently.             Risks and Recommendations:  The patient has the following additional risks and recommendations for perioperative complications:   - No identified additional risk factors other than previously addressed        RECOMMENDATION:  APPROVAL GIVEN to proceed with proposed procedure, without further diagnostic evaluation.    Review of prior external note(s) from - previous office visits   Reviewed labs: lipid panel and thyroid cascade  Discussion of management or test interpretation with external physician/other qualified healthcare professional/appropriate source - Discussed care with Dr. Romano  Diagnosis or treatment significantly limited by social determinants of health - none    45 minutes spent on the date of the encounter doing chart review,  history and exam, documentation and further activities as noted above      Subjective     HPI related to upcoming procedure: fractured 5th metatarsal some weeks ago, noted on imaging. He feels somewhat better from a pain standpoint - no longer using oxycodone for pain management. Has had no changes in numbness, tingling, temperature changes in his R foot compared to his L. ROM reduced secondary to the CAM boot and injury. Reports that he only takes Lipitor and his synthroid, and no other medications at this time.     Preop Questions 2/26/2021   1. Have you ever had a heart attack or stroke? No   2. Have you ever had surgery on your heart or blood vessels, such as a stent placement, a coronary artery bypass, or surgery on an artery in your head, neck, heart, or legs? No   3. Do you have chest pain with activity? No   4. Do you have a history of  heart failure? No   5. Do you currently have a cold, bronchitis or symptoms of other infection? No   6. Do you have a cough, shortness of breath, or wheezing? No   7. Do you or anyone in your family have previous history of blood clots? No   8. Do you or does anyone in your family have a serious bleeding problem such as prolonged bleeding following surgeries or cuts? No   9. Have you ever had problems with anemia or been told to take iron pills? No   10. Have you had any abnormal blood loss such as black, tarry or bloody stools? No   11. Have you ever had a blood transfusion? YES - 1980   11a. Have you ever had a transfusion reaction? No   12. Are you willing to have a blood transfusion if it is medically needed before, during, or after your surgery? NO    13. Have you or any of your relatives ever had problems with anesthesia? No   14. Do you have sleep apnea, excessive snoring or daytime drowsiness? No   15. Do you have any artifical heart valves or other implanted medical devices like a pacemaker, defibrillator, or continuous glucose monitor? No   16. Do you have  artificial joints? No   17. Are you allergic to latex? No       Health Care Directive:  Patient does not have a Health Care Directive or Living Will: Patient states has Advance Directive and will bring in a copy to clinic.    Preoperative Review of :   reviewed - controlled substances reflected in medication list.      Status of Chronic Conditions:  HYPERLIPIDEMIA - Patient has a long history of significant Hyperlipidemia requiring medication for treatment with recent good control. Patient reports no problems or side effects with the medication.       Review of Systems  Constitutional, neuro, ENT, endocrine, pulmonary, cardiac, gastrointestinal, genitourinary, musculoskeletal, integument and psychiatric systems are negative, except as otherwise noted.    Patient Active Problem List    Diagnosis Date Noted     Other and unspecified special symptom or syndrome, not elsewhere classified 02/19/2021     Priority: Medium     Status post Pegasys and Rebetol treatment early 2000s       Nasal septal deviation 02/19/2021     Priority: Medium     Hypertrophy of inferior nasal turbinate 02/19/2021     Priority: Medium     Environmental allergies 02/19/2021     Priority: Medium     Chronic sinusitis 02/19/2021     Priority: Medium     Hypothyroidism, unspecified type 12/04/2020     Priority: Medium     Hyperlipidemia LDL goal <70 12/04/2020     Priority: Medium     CAD (coronary artery disease) 12/04/2020     Priority: Medium     Syncope and collapse 02/11/2020     Priority: Medium     Low bone mass 02/03/2019     Priority: Medium     Osteopenia, history of rib and ankle fracture   DXA 1/2019 T score lumbar spine -0.2 left femoral neck -2.4 left total hip -1.1--follow-up in 3-5 years.       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, uses prn diazepam 5 mg       Subjective tinnitus of right ear 12/23/2015     Priority: Medium     Sensorineural hearing loss, asymmetrical 12/23/2015     Priority: Medium     Sebaceous cyst 04/15/2014     Priority: Medium     Under right buttock, continue observation as not bothersome 4/2014       Hearing loss in right ear 04/28/2012     Priority: Medium     Backache 08/23/2010     Priority: Medium     Personal history of alcoholism (H) 07/03/2005     Priority: Medium      Past Medical History:   Diagnosis Date     Actinic keratosis      Hepatitis C     S/p pegasys and rebatol tx in early 2000s     Hyperlipidemia LDL goal <70      Hypothyroidism      Osteopenia      Sensorineural hearing loss, unilateral     Right ear     Situational anxiety     Flying     Past Surgical History:   Procedure Laterality Date     ARTHROSCOPY KNEE WITH MENISCECTOMY Bilateral      OPEN REDUCTION INTERNAL FIXATION FOREARM Right      Septoplasty       SURGICAL PATHOLOGY EXAM      Parotid mass excision     Current Outpatient Medications   Medication Sig Dispense Refill     aspirin (ASA) 81 MG chewable tablet Take 81 mg by mouth       atorvastatin (LIPITOR) 40 MG tablet TAKE 1 TABLET(40 MG) BY MOUTH DAILY 60 tablet 0     ibuprofen (ADVIL/MOTRIN) 200 MG tablet Take 200-400 mg by mouth       atorvastatin (LIPITOR) 80 MG tablet Take 80 mg by mouth       diazepam (VALIUM) 5 MG tablet TAKE 1 TABLET BY MOUTH TWICE DAILY AS NEEDED FOR FLIGHT ANXIETY       levothyroxine (SYNTHROID/LEVOTHROID) 112 MCG tablet Take 1 tablet (112 mcg) by mouth daily 90 tablet 3     methylPREDNISolone (MEDROL DOSEPAK) 4 MG tablet therapy pack Follow Package Directions (Patient not taking: Reported on 2/12/2021) 21 tablet 0     oxyCODONE-acetaminophen (PERCOCET) 5-325  MG tablet Take 1 tablet by mouth every 6 hours as needed for pain (Patient not taking: Reported on 2/26/2021) 12 tablet 0       Allergies   Allergen Reactions     Sulfa Drugs Rash     Sulfanilamide Rash     Doctor believes he was allergic to topical ointment as a teenager ?sulfa containing        Hydrocodone-Acetaminophen Other (See Comments)     No Clinical Screening - See Comments      PN: LW CM1: CONTRAST- nka Reaction :        Social History     Tobacco Use     Smoking status: Former Smoker     Packs/day: 1.00     Years: 20.00     Pack years: 20.00     Types: Cigarettes     Smokeless tobacco: Never Used   Substance Use Topics     Alcohol use: Not Currently     No additional family history noted  History   Drug Use Unknown         Objective     /69 (BP Location: Left arm, Patient Position: Sitting, Cuff Size: Adult Small)   Pulse 65   Temp 98.2  F (36.8  C) (Oral)   Resp 12   SpO2 96%     Physical Exam    GENERAL APPEARANCE: healthy, alert and no distress     EYES: EOMI,  PERRL     HENT: ear canals and TM's normal and nose and mouth without ulcers or lesions     NECK: no adenopathy, no asymmetry, masses, or scars and thyroid normal to palpation     RESP: lungs clear to auscultation - no rales, rhonchi or wheezes     CV: regular rates and rhythm, normal S1 S2, no S3 or S4 and no murmur, click or rub     ABDOMEN:  soft, nontender, no HSM or masses and bowel sounds normal     MS: CAM boot on R foot, able to move toes, ROM reduced secondary to boot. L foot 5/5 strength, flexion and extension     SKIN: no suspicious lesions or rashes     NEURO: Normal strength and tone, sensory exam grossly normal, mentation intact and speech normal     PSYCH: mentation appears normal. and affect normal/bright     LYMPHATICS: No cervical adenopathy    No results for input(s): HGB, PLT, INR, NA, POTASSIUM, CR, A1C in the last 52362 hours.     Diagnostics:  No labs were ordered during this visit.   No EKG required, no history  of coronary heart disease, significant arrhythmia, peripheral arterial disease or other structural heart disease.    Revised Cardiac Risk Index (RCRI):  The patient has the following serious cardiovascular risks for perioperative complications:   - No serious cardiac risks = 0 points     RCRI Interpretation: 0 point: Class I           Signed Electronically by: Yi Booth MD  Precepted with Dr. Romano.  Copy of this evaluation report is provided to requesting physician.    Preop [facility] Preop Guidelines    Revised Cardiac Risk Index

## 2021-02-26 NOTE — PROGRESS NOTES
Preceptor Attestation:   Patient seen, evaluated and discussed with the resident. I have verified the content of the note, which accurately reflects my assessment of the patient and the plan of care.   Supervising Physician:  Cornelia Romano MD

## 2021-02-26 NOTE — PATIENT INSTRUCTIONS
"  Before Your Procedure or Hospital Admission  Testing for COVID-19 (Coronavirus)  Thank you for choosing [facility] for your health care needs. This is a very challenging time for everyone. The World Health Organization and the State of [place] have declared the COVID-19 virus a pandemic.   Our goal is to keep you and our team here at [facility] safe and healthy. We've taken several steps to make this happen. For example:    We screen our staff, care teams and patients for COVID-19.    Everyone at [facility] must wear a mask and stay 6 feet apart.    We are limiting hospital and clinic visitors.  Before you come in  All patients must get tested for COVID-19. Your test needs to happen 2 to 4 days before you check in to the hospital or surgery site.   A clinic scheduler will call about a week in advance to set up a testing time at one of our labs where we'll take a swab of your nose or throat.  Note: If you go to a clinic or pharmacy like [facility] or [facility] for your test, make sure it's a \"RT-PCR\" test, not a \"rapid\" COVID-19 test. (See Questions and Answers below.)  After the test, please stay at home and stay out of contact with other people. It will be harder for you to recover if you get COVID-19 before your treatment.  Please follow all current safety guidelines, including:    Limit trips outside your home.    Limit the number of people you see.    Always wear a mask outside your home.    Use social distancing. (Stay 6 feet away from others whenever you can.)    Wash your hands often.  If your test shows you have COVID-19  If your test is positive, we'll let you know. A positive test means that you have the virus.   We'll probably have to postpone your admission, surgery or procedure. Your doctor will discuss this with you. After that, we'll let you know what to do and when you can reschedule.   We may need to cancel your treatment on short notice for other reasons, too.  If your test shows " you DON'T have COVID-19  Even if your test is negative, you may still get COVID-19. It's rare but, sometimes, the test result is wrong. You could also catch the virus after taking the test.   There's a very small chance that you could catch COVID-19 in the hospital or surgery center. [facility] has taken many steps to prevent this from happening.   Day of your surgery or procedure    Please come wearing a mask or something else that covers both your nose and mouth.    When you arrive, we'll ask you some questions to find out if you have any signs or symptoms of COVID-19.    Ask your care team if you can have visitors. All visitors must wear masks and will be screened for signs of COVID-19.  ? Even if no visitors are allowed, you can still have with you:    Your legal guardian or legal decision maker    A parent and one other visitor, if you are younger than 18 years old    A partner and a , if you are in labor  ? We might need to teach you about taking care of yourself after surgery. If so, a visitor can come into the hospital to learn about it, too.  ? The rules for visitors change often, depending on how much the virus is spreading. To learn more, see Visiting a Loved One in the Hospital during the COVID-19 Outbreak.  Please call your care team, hospital or surgery center if you have any questions. We thank you for your understanding and for choosing [facility] for your care.   Questions and Answers  Does it matter where I get tested for COVID-19?  Yes. We urge you to get tested at one of our [facility] COVID-19 testing sites. We process these tests in our lab and can get the results quickly. Your [facility] care team needs to get your results before you check in.  What should I do if I can't get tested at [facility]?  You can get tested somewhere else, but you'll need to take these extra steps:  1. Contact your family doctor or clinic to arrange your test.  2. Take the test  "within 4 days of your surgery or procedure. We can't accept tests older than 4 days.  3. Make sure your doctor or clinic faxes your results to [facility] at [phone].  If we don't get your results in time, we may have to postpone or cancel your treatment.  Ask if you're getting a \"RT-PCR\" COVID-19 test. It should NOT be a \"rapid\" COVID-19 test. Many drug stores use \"rapid\" tests, but they may not be as accurate. We don't accept the results of \"rapid\" tests.  For informational purposes only. Not to replace the advice of your health care provider. Copyright   2020 [facility]. All rights reserved. Clinically reviewed by In[facility] and the [facility] COVID-19 Clinical Team. [employer] [id number] - REV 10/20.    How to Take Your Medication Before Surgery  Continue taking your medication as prescribed.    "

## 2021-02-26 NOTE — PROGRESS NOTES
[facility] [address] [place] [address]  Phone: [phone]  Fax: [phone]  Primary Provider: Tato Holloway  {FV AMB Performing Provider (Optional):193280}      PREOPERATIVE EVALUATION:  Today's date: 2/26/2021    Steve Jin is a 60 year old male who presents for a preoperative evaluation.    Surgical Information:  Surgery/Procedure: ***  Surgery Location: ***  Surgeon: ***  Surgery Date: ***  Time of Surgery: ***  Where patient plans to recover: {Preop post recovery plans :129301}  Fax number for surgical facility: {SURGERY FAX NUMBER:[id number]}    Type of Anesthesia Anticipated: {ANESTHESIA:612144}    {2021 Provider Charting Preference for Preop :282461}    Subjective     HPI related to upcoming procedure: ***    Preop Questions 2/26/2021   1. Have you ever had a heart attack or stroke? No   2. Have you ever had surgery on your heart or blood vessels, such as a stent placement, a coronary artery bypass, or surgery on an artery in your head, neck, heart, or legs? No   3. Do you have chest pain with activity? No   4. Do you have a history of  heart failure? No   5. Do you currently have a cold, bronchitis or symptoms of other infection? No   6. Do you have a cough, shortness of breath, or wheezing? No   7. Do you or anyone in your family have previous history of blood clots? No   8. Do you or does anyone in your family have a serious bleeding problem such as prolonged bleeding following surgeries or cuts? No   9. Have you ever had problems with anemia or been told to take iron pills? No   10. Have you had any abnormal blood loss such as black, tarry or bloody stools? No   11. Have you ever had a blood transfusion? YES - ***   11a. Have you ever had a transfusion reaction? No   12. Are you willing to have a blood transfusion if it is medically needed before, during, or after your surgery? NO - ***   13. Have you or any of your relatives ever had problems with anesthesia?  No   14. Do you have sleep apnea, excessive snoring or daytime drowsiness? No   15. Do you have any artifical heart valves or other implanted medical devices like a pacemaker, defibrillator, or continuous glucose monitor? No   16. Do you have artificial joints? No   17. Are you allergic to latex? No       Health Care Directive:  Patient does not have a Health Care Directive or Living Will: {ADVANCE_DIRECTIVE_STATUS:840514}    Preoperative Review of :  {Mnpmpreport:334153}  {Review MNPMP for all patients per [facility] MNPMP Profile:561372}    {Chronic problem details (Optional) :136816}    Review of Systems  {ROS Preop Choices:104901}    Patient Active Problem List    Diagnosis Date Noted     Other and unspecified special symptom or syndrome, not elsewhere classified 02/19/2021     Priority: Medium     Status post Pegasys and Rebetol treatment early 2000s       Nasal septal deviation 02/19/2021     Priority: Medium     Hypertrophy of inferior nasal turbinate 02/19/2021     Priority: Medium     Environmental allergies 02/19/2021     Priority: Medium     Chronic sinusitis 02/19/2021     Priority: Medium     Hypothyroidism, unspecified type 12/04/2020     Priority: Medium     Hyperlipidemia LDL goal <70 12/04/2020     Priority: Medium     CAD (coronary artery disease) 12/04/2020     Priority: Medium     Syncope and collapse 02/11/2020     Priority: Medium     Low bone mass 02/03/2019     Priority: Medium     Osteopenia, history of rib and ankle fracture   DXA 1/2019 T score lumbar spine -0.2 left femoral neck -2.4 left total hip -1.1--follow-up in 3-5 years.       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, uses prn diazepam 5 mg       Subjective tinnitus of right ear 12/23/2015     Priority: Medium     Sensorineural hearing loss, asymmetrical 12/23/2015     Priority: Medium     Sebaceous cyst 04/15/2014     Priority: Medium     Under right buttock, continue observation as not bothersome 4/2014       Hearing loss in right ear 04/28/2012     Priority: Medium     Backache 08/23/2010     Priority: Medium     Personal history of alcoholism (H) 07/03/2005     Priority: Medium      Past Medical History:   Diagnosis Date     Actinic keratosis      Hepatitis C     S/p pegasys and rebatol tx in early 2000s     Hyperlipidemia LDL goal <70      Hypothyroidism      Osteopenia      Sensorineural hearing loss, unilateral     Right ear     Situational anxiety     Flying     Past Surgical History:   Procedure Laterality Date     ARTHROSCOPY KNEE WITH MENISCECTOMY Bilateral      OPEN REDUCTION INTERNAL FIXATION FOREARM Right      Septoplasty       SURGICAL PATHOLOGY EXAM      Parotid mass excision     Current Outpatient Medications   Medication Sig Dispense Refill     aspirin (ASA) 81 MG chewable tablet Take 81 mg by mouth       atorvastatin (LIPITOR) 40 MG tablet TAKE 1 TABLET(40 MG) BY MOUTH DAILY 60 tablet 0     ibuprofen (ADVIL/MOTRIN) 200 MG tablet Take 200-400 mg by mouth       atorvastatin (LIPITOR) 80 MG tablet Take 80 mg by mouth       diazepam (VALIUM) 5 MG tablet TAKE 1 TABLET BY MOUTH TWICE DAILY AS NEEDED FOR FLIGHT ANXIETY       levothyroxine (SYNTHROID/LEVOTHROID) 112 MCG tablet Take 1 tablet (112 mcg) by mouth daily 90 tablet 3     methylPREDNISolone (MEDROL DOSEPAK) 4 MG tablet therapy pack Follow Package Directions (Patient not taking: Reported on 2/12/2021) 21 tablet 0     oxyCODONE-acetaminophen (PERCOCET) 5-325 MG tablet Take 1 tablet by mouth every 6 hours as needed for pain (Patient not taking: Reported on 2/26/2021) 12 tablet 0  "      Allergies   Allergen Reactions     Sulfa Drugs Rash     Sulfanilamide Rash     Doctor believes he was allergic to topical ointment as a teenager ?sulfa containing        Hydrocodone-Acetaminophen Other (See Comments)     No Clinical Screening - See Comments      PN: LW CM1: CONTRAST- nka Reaction :        Social History     Tobacco Use     Smoking status: Former Smoker     Packs/day: 1.00     Years: 20.00     Pack years: 20.00     Types: Cigarettes     Smokeless tobacco: Never Used   Substance Use Topics     Alcohol use: Not Currently     {FAMILY HISTORY (Optional):640237297}  History   Drug Use Unknown         Objective     There were no vitals taken for this visit.    Physical Exam  {EXAM Preop Choices:192794}    No results for input(s): HGB, PLT, INR, NA, POTASSIUM, CR, A1C in the last 08570 hours.     Diagnostics:  {LABS:386697}   {EK}    Revised Cardiac Risk Index (RCRI):  The patient has the following serious cardiovascular risks for perioperative complications:  {PREOP REVISED CARDIAC RISK INDEX (RCRI) :103996::\" - No serious cardiac risks = 0 points\"}     RCRI Interpretation: {REVISED CARDIAC RISK INTERPRETATION :446686}    {Provider  Link to PREOP SmartSet  Includes common orders; guidelines for anemia, warfarin, additional testing; patient instructions  :558948}       Signed Electronically by: Yi Booth MD  Copy of this evaluation report is provided to requesting physician.    Preop [facility] Preop Guidelines    Revised Cardiac Risk Index   "

## 2021-03-12 ENCOUNTER — AMBULATORY - HEALTHEAST (OUTPATIENT)
Dept: CARDIOLOGY | Facility: CLINIC | Age: 61
End: 2021-03-12

## 2021-03-12 ENCOUNTER — OFFICE VISIT - HEALTHEAST (OUTPATIENT)
Dept: CARDIOLOGY | Facility: CLINIC | Age: 61
End: 2021-03-12

## 2021-03-12 DIAGNOSIS — I45.10 RBBB: ICD-10-CM

## 2021-03-12 DIAGNOSIS — I25.10 CORONARY ARTERY DISEASE DUE TO LIPID RICH PLAQUE: ICD-10-CM

## 2021-03-12 DIAGNOSIS — E78.2 MIXED HYPERLIPIDEMIA: ICD-10-CM

## 2021-03-12 DIAGNOSIS — I25.83 CORONARY ARTERY DISEASE DUE TO LIPID RICH PLAQUE: ICD-10-CM

## 2021-03-12 DIAGNOSIS — R55 SYNCOPE: ICD-10-CM

## 2021-03-12 ASSESSMENT — MIFFLIN-ST. JEOR: SCORE: 1511.6

## 2021-03-25 ENCOUNTER — DOCUMENTATION ONLY (OUTPATIENT)
Dept: CARE COORDINATION | Facility: CLINIC | Age: 61
End: 2021-03-25

## 2021-03-26 ENCOUNTER — OFFICE VISIT (OUTPATIENT)
Dept: DERMATOLOGY | Facility: CLINIC | Age: 61
End: 2021-03-26
Payer: COMMERCIAL

## 2021-03-26 DIAGNOSIS — L21.9 DERMATITIS, SEBORRHEIC: Primary | ICD-10-CM

## 2021-03-26 DIAGNOSIS — Z00.00 HEALTHCARE MAINTENANCE: ICD-10-CM

## 2021-03-26 DIAGNOSIS — L81.4 SOLAR LENTIGO: ICD-10-CM

## 2021-03-26 DIAGNOSIS — L30.9 ECZEMA, UNSPECIFIED TYPE: ICD-10-CM

## 2021-03-26 PROCEDURE — 99203 OFFICE O/P NEW LOW 30 MIN: CPT | Performed by: DERMATOLOGY

## 2021-03-26 RX ORDER — KETOCONAZOLE 20 MG/ML
SHAMPOO TOPICAL
Qty: 120 ML | Refills: 11 | Status: SHIPPED | OUTPATIENT
Start: 2021-03-26 | End: 2021-12-06

## 2021-03-26 ASSESSMENT — PAIN SCALES - GENERAL: PAINLEVEL: NO PAIN (0)

## 2021-03-26 NOTE — NURSING NOTE
Dermatology Rooming Note    Yumiko Jin's goals for this visit include:   Chief Complaint   Patient presents with     Skin Check     yumiko is coming in today for a skin check, states that he has some ezema on his torso and arms     Mackenzie Santana CMA on 3/26/2021 at 2:03 PM

## 2021-03-26 NOTE — LETTER
3/26/2021       RE: Yumiko Jin  [address]  [place] [address]     Dear Colleague,    Thank you for referring your patient, Yumiko Jin, to the [facility] [place] at [facility]. Please see a copy of my visit note below.    [facility] Dermatology Note  Encounter Date: Mar 26, 2021  Office Visit     Dermatology Problem List:  1. Seborrheic dermatitis.  - Ketoconazole shampoo  2. Eczema.  - TMC 0.1% ointment  3. AKs.  - History of Efudex use    ____________________________________________     Assessment & Plan:     # Seborrheic dermatitis.    - Start ketoconazole shampoo 3 times weekly. Massage into wet scalp, let sit 3-5 min, then rinse.     # Eczema, worse in winter..    - Advised daily moisturization with bland emollient.   - Start TMC 0.1% ointment  BID prn  - Dry skin care handout provided.     # Benign lesions: solar lentigenes. No treatment required.    Procedures Performed:   None    Follow-up: 1 year, sooner if concerns.     Staff:     Alejandra Acevedo MD    Department of Dermatology  [facility]: Phone: [phone], Fax: [phone]  4/1/2021    ____________________________________________    CC: Skin Check (yumiko is coming in today for a skin check, states that he has some ezema on his torso and arms)    HPI:  Mr. Yumiko Jin is a(n) 60 year old male who presents today as a new patient for skin check and eczema.  Has eczema on torso and arms. OTC lotions such as Eucerin help. Hasn't used prescription steroids before.  Previously used Efudex in past.  Has history of significant sun exposure in past.    No personal or family history of skin cancer.     Patient is otherwise feeling well, without additional skin concerns.     Labs Reviewed:  N/A    Physical Exam:  Vitals: There were no vitals  taken for this visit.  SKIN: Total skin excluding the undergarment areas was performed. The exam included the head/face, neck, both arms, chest, back, abdomen, both legs, digits and/or nails.   - redness/scaling in scalp.  - mild xerosis.  - light brown macules in sun-exposed locations.  - No other lesions of concern on areas examined.     Medications:  Current Outpatient Medications   Medication     aspirin (ASA) 81 MG chewable tablet     atorvastatin (LIPITOR) 40 MG tablet     ibuprofen (ADVIL/MOTRIN) 200 MG tablet     ketoconazole (NIZORAL) 2 % external shampoo     levothyroxine (SYNTHROID/LEVOTHROID) 112 MCG tablet     atorvastatin (LIPITOR) 80 MG tablet     diazepam (VALIUM) 5 MG tablet     methylPREDNISolone (MEDROL DOSEPAK) 4 MG tablet therapy pack     oxyCODONE-acetaminophen (PERCOCET) 5-325 MG tablet     No current facility-administered medications for this visit.       Past Medical History:   Patient Active Problem List   Diagnosis     Hypothyroidism, unspecified type     Hyperlipidemia LDL goal <70     CAD (coronary artery disease)     Tendinopathy of rotator cuff     Tear of right glenoid labrum     Syncope and collapse     Subjective tinnitus of right ear     Situational anxiety     Sensorineural hearing loss, asymmetrical     Sebaceous cyst     Primary osteoarthritis of left knee     Personal history of alcoholism (H)     Other and unspecified special symptom or syndrome, not elsewhere classified     Nasal septal deviation     Low bone mass     Hypertrophy of inferior nasal turbinate     Hearing loss in right ear     Environmental allergies     Closed fracture of left distal fibula     Chronic sinusitis     Biceps tendinopathy of right upper extremity     Backache     Past Medical History:   Diagnosis Date     Actinic keratosis      Hepatitis C     S/p pegasys and rebatol tx in early 2000s     Hyperlipidemia LDL goal <70      Hypothyroidism      Osteopenia      Sensorineural hearing loss, unilateral      Right ear     Situational anxiety     Flying       CC Jair Borrero MD  [address] [place] [address] on close of this encounter.

## 2021-03-26 NOTE — PATIENT INSTRUCTIONS
Skin looks great today!  No signs of skin cancer.    Keep an eye on spot on the nose, let me know if grows/bleeds/changes.    For eczema, continue the moisturizer and try the triamcinolone.  Let me know if you need refills or the non-steroid option for safer long term use.    For scalp, start ketoconazole shampoo 3 times weekly. Massage into wet scalp, let sit 3-5 min, then rinse.  Let me know if red/scaly spots don't go away.    Return in 1 year, sooner if concerns.       Patient Education     Checking for Skin Cancer  You can find cancer early by checking your skin each month. There are 3 kinds of skin cancer. They are melanoma, basal cell carcinoma, and squamous cell carcinoma. Doing monthly skin checks is the best way to find new marks or skin changes. Follow the instructions below for checking your skin.   The ABCDEs of checking moles for melanoma   Check your moles or growths for signs of melanoma using ABCDE:     Asymmetry: the sides of the mole or growth don t match    Border: the edges are ragged, notched, or blurred    Color: the color within the mole or growth varies    Diameter: the mole or growth is larger than 6 mm (size of a pencil eraser)    Evolving: the size, shape, or color of the mole or growth is changing (evolving is not shown in the images below)    Checking for other types of skin cancer  Basal cell carcinoma or squamous cell carcinoma have symptoms such as:       A spot or mole that looks different from all other marks on your skin    Changes in how an area feels, such as itching, tenderness, or pain    Changes in the skin's surface, such as oozing, bleeding, or scaliness    A sore that does not heal    New swelling or redness beyond the border of a mole    Who s at risk?  Anyone can get skin cancer. But you are at greater risk if you have:     Fair skin, light-colored hair, or light-colored eyes    Many moles or abnormal moles on your skin    A history of sunburns from sunlight or tanning  beds    A family history of skin cancer    A history of exposure to radiation or chemicals    A weakened immune system  If you have had skin cancer in the past, you are at risk for recurring skin cancer.   How to check your skin  Do your monthly skin checkups in front of a full-length mirror. Check all parts of your body, including your:     Head (ears, face, neck, and scalp)    Torso (front, back, and sides)    Arms (tops, undersides, upper, and lower armpits)    Hands (palms, backs, and fingers, including under the nails)    Buttocks and genitals    Legs (front, back, and sides)    Feet (tops, soles, toes, including under the nails, and between toes)  If you have a lot of moles, take digital photos of them each month. Make sure to take photos both up close and from a distance. These can help you see if any moles change over time.   Most skin changes are not cancer. But if you see any changes in your skin, call your doctor right away. Only he or she can diagnose a problem. If you have skin cancer, seeing your doctor can be the first step toward getting the treatment that could save your life.   The [employer] last reviewed this educational content on 4/1/2019 2000-2020 [employer] [address]. [address] [place] [address], [place] [address]. All rights reserved. This information is not intended as a substitute for professional medical care. Always follow your healthcare professional's instructions.       When should I call my doctor?    If you are worsening or not improving, please, contact us or seek urgent care as noted below.     Who should I call with questions (adults)?    [facility] (adult and pediatric): [phone]     [facility] (adult): [phone]    For urgent needs outside of business hours call the [facility] at [phone] and ask for the dermatology resident on call    If this is a medical emergency and you are unable to reach an ER,  Call [phone]      Who should I call with questions (pediatric)?  [facility]- Pediatric Dermatology  Dr. Bethany Pozo, Dr. Osmar Barrera, Dr. Diamond Mars, NARINDER Bella Dr[address] Dr. Rashmi Bustos & Dr. Yvon Castano  Non Urgent  Nurse Triage Line; [phone]- Marina and Lela RN Care Coordinators   Mirian (/Complex ) [phone]    If you need a prescription refill, please contact your pharmacy. Refills are approved or denied by our Physicians during normal business hours, Monday through Fridays  Per office policy, refills will not be granted if you have not been seen within the past year (or sooner depending on your child's condition)    Scheduling Information:  Pediatric Appointment Scheduling and Call Center [phone]  Radiology Scheduling- [phone]  Sedation Unit Scheduling- [phone]  [place] Scheduling- [facility] [phone]; Pediatric Dermatology [phone]  Main  Services: [phone]  Mozambican: [phone]  Czech: [phone]  Hmong/Tacho/Keagan: [phone]  Preadmission Nursing Department Fax Number: [phone] (Fax all pre-operative paperwork to this number)    For urgent matters arising during evenings, weekends, or holidays that cannot wait for normal business hours please call [phone] and ask for the Dermatology Resident On-Call to be paged.     Dry Skin    What is dry skin?    Common skin problem    Can be worse during the winter     Affects all ages    Occurs in people with or without other skin problems    What does it look like?    Fine lines in the skin become more visible     Rough feeling skin     Flaky skin    Most common on the arms and legs    Skin can become cracked, especially on the hands and feet    What are some problems caused by dry skin?     Itching    Rubbing or scratching can cause thickened, rough skin patches    Cracks in skin can be painful    Red, itchy, scaly skin  (called eczema) can occur    Yellow crusting or pus could be signs of an infection    What causes dry skin?    A lack of water in the top layer of the skin    Too much soapy water,  hot water, or harsh chemicals    Aging and sun damage    How do I treat dry skin?    Shower or bathe daily for under ten minutes with lukewarm water and mild soap.    Pat yourself dry with a towel gently and leave your skin slightly damp.    Use moisturizing cream or ointment right away.  Avoid lotions.    What kind of mild soap should I be using?    Camay , Dove , Tone , Neutrogena , Purpose , or Oil of Olay     A non-detergent cleanser, like Cetaphil , can be used.    What should I stay away from?    Scented soaps     Bath oils    What moisturizers should I be using?    Cetaphil Cream,CeraVe Cream, Vanicream, Aquaphilic, Eucerin, Aquaphor, or Vaseline     Always apply after showering or bathing.    Reapply throughout the day, if possible.    If dry skin affects your hands, always reapply after handwashing.    What else should I know?    Using a humidifier during winter months may help.    If dry skin gets worse or if eczema develops, a steroid cream may be needed.

## 2021-04-01 NOTE — PROGRESS NOTES
[facility] Dermatology Note  Encounter Date: Mar 26, 2021  Office Visit     Dermatology Problem List:  1. Seborrheic dermatitis.  - Ketoconazole shampoo  2. Eczema.  - TMC 0.1% ointment  3. AKs.  - History of Efudex use    ____________________________________________     Assessment & Plan:     # Seborrheic dermatitis.    - Start ketoconazole shampoo 3 times weekly. Massage into wet scalp, let sit 3-5 min, then rinse.     # Eczema, worse in winter..    - Advised daily moisturization with bland emollient.   - Start TMC 0.1% ointment  BID prn  - Dry skin care handout provided.     # Benign lesions: solar lentigenes. No treatment required.    Procedures Performed:   None    Follow-up: 1 year, sooner if concerns.     Staff:     Alejandra Acevedo MD    Department of Dermatology  [facility]: Phone: [phone], Fax: [phone]  4/1/2021    ____________________________________________    CC: Skin Check (yumiko is coming in today for a skin check, states that he has some ezema on his torso and arms)    HPI:  Mr. Yumiko Jin is a(n) 60 year old male who presents today as a new patient for skin check and eczema.  Has eczema on torso and arms. OTC lotions such as Eucerin help. Hasn't used prescription steroids before.  Previously used Efudex in past.  Has history of significant sun exposure in past.    No personal or family history of skin cancer.     Patient is otherwise feeling well, without additional skin concerns.     Labs Reviewed:  N/A    Physical Exam:  Vitals: There were no vitals taken for this visit.  SKIN: Total skin excluding the undergarment areas was performed. The exam included the head/face, neck, both arms, chest, back, abdomen, both legs, digits and/or nails.   - redness/scaling in scalp.  - mild xerosis.  - light brown macules in sun-exposed locations.  - No other lesions of concern on areas  examined.     Medications:  Current Outpatient Medications   Medication     aspirin (ASA) 81 MG chewable tablet     atorvastatin (LIPITOR) 40 MG tablet     ibuprofen (ADVIL/MOTRIN) 200 MG tablet     ketoconazole (NIZORAL) 2 % external shampoo     levothyroxine (SYNTHROID/LEVOTHROID) 112 MCG tablet     atorvastatin (LIPITOR) 80 MG tablet     diazepam (VALIUM) 5 MG tablet     methylPREDNISolone (MEDROL DOSEPAK) 4 MG tablet therapy pack     oxyCODONE-acetaminophen (PERCOCET) 5-325 MG tablet     No current facility-administered medications for this visit.       Past Medical History:   Patient Active Problem List   Diagnosis     Hypothyroidism, unspecified type     Hyperlipidemia LDL goal <70     CAD (coronary artery disease)     Tendinopathy of rotator cuff     Tear of right glenoid labrum     Syncope and collapse     Subjective tinnitus of right ear     Situational anxiety     Sensorineural hearing loss, asymmetrical     Sebaceous cyst     Primary osteoarthritis of left knee     Personal history of alcoholism (H)     Other and unspecified special symptom or syndrome, not elsewhere classified     Nasal septal deviation     Low bone mass     Hypertrophy of inferior nasal turbinate     Hearing loss in right ear     Environmental allergies     Closed fracture of left distal fibula     Chronic sinusitis     Biceps tendinopathy of right upper extremity     Backache     Past Medical History:   Diagnosis Date     Actinic keratosis      Hepatitis C     S/p pegasys and rebatol tx in early 2000s     Hyperlipidemia LDL goal <70      Hypothyroidism      Osteopenia      Sensorineural hearing loss, unilateral     Right ear     Situational anxiety     Flying       CC Jair Borrero MD  [address] [place] [address] on close of this encounter.

## 2021-04-29 ENCOUNTER — OFFICE VISIT - HEALTHEAST (OUTPATIENT)
Dept: PHYSICAL THERAPY | Facility: REHABILITATION | Age: 61
End: 2021-04-29

## 2021-04-29 ENCOUNTER — RECORDS - HEALTHEAST (OUTPATIENT)
Dept: ADMINISTRATIVE | Facility: REHABILITATION | Age: 61
End: 2021-04-29

## 2021-04-29 DIAGNOSIS — S99.199A JONES FRACTURE: ICD-10-CM

## 2021-04-29 DIAGNOSIS — M79.671 RIGHT FOOT PAIN: ICD-10-CM

## 2021-04-29 DIAGNOSIS — M21.371 FOOT DROP, RIGHT FOOT: ICD-10-CM

## 2021-04-29 DIAGNOSIS — Z47.89 ORTHOPEDIC AFTERCARE: ICD-10-CM

## 2021-05-05 ENCOUNTER — OFFICE VISIT - HEALTHEAST (OUTPATIENT)
Dept: PHYSICAL THERAPY | Facility: REHABILITATION | Age: 61
End: 2021-05-05

## 2021-05-05 DIAGNOSIS — M21.371 FOOT DROP, RIGHT FOOT: ICD-10-CM

## 2021-05-05 DIAGNOSIS — M79.671 RIGHT FOOT PAIN: ICD-10-CM

## 2021-05-07 ENCOUNTER — OFFICE VISIT - HEALTHEAST (OUTPATIENT)
Dept: PHYSICAL THERAPY | Facility: REHABILITATION | Age: 61
End: 2021-05-07

## 2021-05-07 DIAGNOSIS — M21.371 FOOT DROP, RIGHT FOOT: ICD-10-CM

## 2021-05-07 DIAGNOSIS — M79.671 RIGHT FOOT PAIN: ICD-10-CM

## 2021-05-11 ENCOUNTER — OFFICE VISIT - HEALTHEAST (OUTPATIENT)
Dept: PHYSICAL THERAPY | Facility: REHABILITATION | Age: 61
End: 2021-05-11

## 2021-05-11 DIAGNOSIS — M79.671 RIGHT FOOT PAIN: ICD-10-CM

## 2021-05-11 DIAGNOSIS — M21.371 FOOT DROP, RIGHT FOOT: ICD-10-CM

## 2021-05-13 ENCOUNTER — OFFICE VISIT - HEALTHEAST (OUTPATIENT)
Dept: PHYSICAL THERAPY | Facility: REHABILITATION | Age: 61
End: 2021-05-13

## 2021-05-13 DIAGNOSIS — M79.671 RIGHT FOOT PAIN: ICD-10-CM

## 2021-05-13 DIAGNOSIS — M21.371 FOOT DROP, RIGHT FOOT: ICD-10-CM

## 2021-05-14 ENCOUNTER — OFFICE VISIT (OUTPATIENT)
Dept: FAMILY MEDICINE | Facility: CLINIC | Age: 61
End: 2021-05-14
Payer: COMMERCIAL

## 2021-05-14 VITALS
HEART RATE: 63 BPM | RESPIRATION RATE: 18 BRPM | TEMPERATURE: 96.4 F | SYSTOLIC BLOOD PRESSURE: 126 MMHG | BODY MASS INDEX: 22.19 KG/M2 | WEIGHT: 155 LBS | DIASTOLIC BLOOD PRESSURE: 76 MMHG | OXYGEN SATURATION: 100 % | HEIGHT: 70 IN

## 2021-05-14 DIAGNOSIS — F40.243 FEAR OF FLYING: ICD-10-CM

## 2021-05-14 DIAGNOSIS — Z00.00 ROUTINE GENERAL MEDICAL EXAMINATION AT A HEALTH CARE FACILITY: Primary | ICD-10-CM

## 2021-05-14 LAB
% IMMATURE GRANULOCYTES: 0 % (ref 0–0)
ABS IMMATURE GRANULOCYTES: 0 10E9/L (ref 0–0)
BASOPHILS # BLD AUTO: 0.1 10E9/L (ref 0–0.2)
BASOPHILS NFR BLD AUTO: 1 % (ref 0–2)
BUN SERPL-MCNC: 14.7 MG/DL (ref 7–21)
CALCIUM SERPL-MCNC: 10.3 MG/DL (ref 8.5–10.1)
CHLORIDE SERPLBLD-SCNC: 101.2 MMOL/L (ref 98–110)
CO2 SERPL-SCNC: 31.4 MMOL/L (ref 20–32)
CREAT SERPL-MCNC: 0.7 MG/DL (ref 0.7–1.3)
EOSINOPHIL # BLD AUTO: 0.4 10E9/L (ref 0–0.7)
EOSINOPHIL NFR BLD AUTO: 6 % (ref 0–6)
ERYTHROCYTE [DISTWIDTH] IN BLOOD BY AUTOMATED COUNT: 12 % (ref 10–15)
GFR SERPL CREATININE-BSD FRML MDRD: >90 ML/MIN/1.7 M2
GLUCOSE SERPL-MCNC: 71.3 MG'DL (ref 70–99)
HCT VFR BLD AUTO: 46.2 % (ref 40–53)
HEMOGLOBIN: 15.5 G/DL (ref 13.3–17.7)
HIV 1+2 AB+HIV1 P24 AG SERPL QL IA: NEGATIVE
LYMPHOCYTES # BLD AUTO: 1.3 10E9/L (ref 0.8–5.3)
LYMPHOCYTES NFR BLD AUTO: 20.3 % (ref 20–48)
MCH RBC QN AUTO: 32 PG (ref 26.5–35)
MCHC RBC AUTO-ENTMCNC: 33.5 G/DL (ref 32–36)
MCV RBC AUTO: 95.5 FL (ref 78–100)
MONOCYTES # BLD AUTO: 0.6 10E9/L (ref 0–1.3)
MONOCYTES NFR BLD AUTO: 9 % (ref 0–12)
NEUTROPHILS # BLD AUTO: 4.2 10E9/L (ref 1.6–8.3)
NEUTROPHILS NFR BLD AUTO: 63.8 % (ref 40–75)
PLATELET # BLD AUTO: 156 10E9/L (ref 150–450)
POTASSIUM SERPL-SCNC: 4.6 MMOL/L (ref 3.2–4.6)
RBC # BLD AUTO: 4.8 10E12/L (ref 4.4–5.9)
SODIUM SERPL-SCNC: 137.6 MMOL/L (ref 132–142)
WBC # BLD AUTO: 6.6 10E9/L (ref 4–11)

## 2021-05-14 PROCEDURE — 80048 BASIC METABOLIC PNL TOTAL CA: CPT | Performed by: STUDENT IN AN ORGANIZED HEALTH CARE EDUCATION/TRAINING PROGRAM

## 2021-05-14 PROCEDURE — 85025 COMPLETE CBC W/AUTO DIFF WBC: CPT | Performed by: STUDENT IN AN ORGANIZED HEALTH CARE EDUCATION/TRAINING PROGRAM

## 2021-05-14 PROCEDURE — 36415 COLL VENOUS BLD VENIPUNCTURE: CPT | Performed by: STUDENT IN AN ORGANIZED HEALTH CARE EDUCATION/TRAINING PROGRAM

## 2021-05-14 PROCEDURE — 99396 PREV VISIT EST AGE 40-64: CPT | Mod: GC | Performed by: STUDENT IN AN ORGANIZED HEALTH CARE EDUCATION/TRAINING PROGRAM

## 2021-05-14 RX ORDER — DIAZEPAM 5 MG
5 TABLET ORAL
Qty: 10 TABLET | Refills: 0 | Status: SHIPPED | OUTPATIENT
Start: 2021-05-14 | End: 2021-12-06

## 2021-05-14 ASSESSMENT — MIFFLIN-ST. JEOR: SCORE: 1501.39

## 2021-05-14 NOTE — PROGRESS NOTES
Male Physical Note      Concerns today:     Osteopenia - noted on DEXA in 2019, hx of 3 fx in past few years. Most recent 5th metatarsal R foot after he noticed foot pain while walking his dog, prior to onset of pain he jumped and landed on a rock. 2 previous fractures sustained to his L ribs and L ankle. L ankle 3 years ago during skiing down steep hill. Rib fx 2 years ago after falling over backwards on snow and while moving at a slow speed. No FHMx of hip fractures or osteoporosis. Hx of Hypothyroidism on thyroid medications. No issues of parathyroidism. Taking vitamin D supplements and Calcium 500mg. Wondering if he may have low testosterone that may be playing a part in his bone health.     R foot drop: Some impingement in L5, onset weeks after surgery after being immobilized in CAM boot. Improving after physical therapy.     ROS:                      CONSTITUTIONAL: no fatigue, no unexpected change in weight. Fatigue.   MSK: R foot drop, improving  SKIN: no worrisome rashes, no worrisome moles, no worrisome lesions  EYES: no acute vision problems or changes  ENT: no ear problems, no mouth problems, no throat problems  RESP: no significant cough, no shortness of breath  CV: no chest pain, no palpitations, no new or worsening peripheral edema  GI: no nausea, no vomiting, no constipation, no diarrhea    Past Medical History:   Diagnosis Date     Actinic keratosis      Hepatitis C     S/p pegasys and rebatol tx in early 2000s     Hyperlipidemia LDL goal <70      Hypothyroidism      Osteopenia      Sensorineural hearing loss, unilateral     Right ear     Situational anxiety     Flying        Family History   Problem Relation Age of Onset     Hypothyroidism Sister      Cancer Sister      Hypothyroidism Brother      Cerebrovascular Disease No family hx of      Diabetes No family hx of      Colon Cancer No family hx of      Heart Disease No family hx of      Hypertension No family hx of      Reviewed no other  "significant FH           Family History and past Medical History reviewed and unchanged/updated.    Social History     Tobacco Use     Smoking status: Former Smoker     Packs/day: 1.00     Years: 20.00     Pack years: 20.00     Types: Cigarettes     Smokeless tobacco: Never Used   Substance Use Topics     Alcohol use: Not Currently     , for 19 years  Children ? no    Has anyone hurt you physically, for example by pushing, hitting, slapping or kicking you or forcing you to have sex? Denies  Do you feel threatened or controlled by a partner, ex-partner or anyone in your life? Denies    RISK BEHAVIORS AND HEALTHY HABITS:  Tobacco Use/Smoking: None. Smoked 1PPD for 20 years, quit 25 years ago   Illicit Drug Use: None  ETOH: None  Do you use alcohol? No  Sexually Active: Yes, no concerns about STD/STIs  Diet (5-7 servings of fruits/veg daily): Yes , vegetarian for 1 year  Exercise (30 min accumulated most days):Yes  Dental Care: Yes   Calcium 1500 mg/d: 500mg every day   Seat Belt Use: Yes     HIV screening:  Recommended and patient accepted testing.  On Aspirin every day   Colon CA Screening (>50-75 ):  Recommended and patient accepted testing.  Prostate screen done recently with PSA 1/2021     CV Risk based on Pooled Cohort Risk:5.5%, already on statin     Immunization History   Administered Date(s) Administered     COVID-19,PF,Pfizer 01/12/2021, 02/02/2021     DTaP, Unspecified 01/24/2012     Influenza (H1N1) 11/10/2009     Influenza Vaccine IM > 6 months Valent IIV4 12/01/2017, 10/16/2019     Typhoid IM 03/20/2012     Zoster vaccine recombinant adjuvanted (SHINGRIX) 08/17/2018, 02/01/2019     Reviewed Immunization Record Today    EXAMINATION:  /76 (BP Location: Left arm, Patient Position: Sitting, Cuff Size: Adult Regular)   Pulse 63   Temp 96.4  F (35.8  C) (Tympanic)   Resp 18   Ht 1.765 m (5' 9.5\")   Wt 70.3 kg (155 lb)   SpO2 100%   BMI 22.56 kg/m    GENERAL: healthy, alert and no " distress  EYES: Eyes grossly normal to inspection, extraocular movements - intact, and PERRL  HENT: ear canals- normal; TMs- normal; Nose- normal; Mouth- no ulcers, no lesions  NECK: no tenderness, no adenopathy, no asymmetry, no masses, no stiffness; thyroid- normal to palpation  RESP: lungs clear to auscultation - no rales, no rhonchi, no wheezes  BREAST: no masses, no tenderness, no nipple discharge, no palpable axillary masses or adenopathy  CV: regular rates and rhythm, normal S1 S2, no S3 or S4 and no murmur, no click or rub -  ABDOMEN: soft, no tenderness, no  hepatosplenomegaly, no masses, normal bowel sounds  MS: extremities- no gross deformities noted, no edema  SKIN: no suspicious lesions, no rashes  NEURO: strength and tone- normal, sensory exam- grossly normal, mentation- intact, speech- normal, reflexes- symmetric  BACK: no CVA tenderness, no paralumbar tenderness  - male: testicles- normal, no atrophy, no masses;  no inguinal hernias  RECTAL- male: no masses, no hemorhoids, Prostate- symmetrically enlarged, no  nodularity, no masses,   PSYCH: Alert and oriented times 3; speech- coherent , normal rate and volume; able to articulate logical thoughts, able to abstract reason, no tangential thoughts, no hallucinations or delusions, affect- normal  LYMPHATICS: ant. cervical- normal, post. cervical- normal, axillary- normal, supraclavicular- normal, inguinal- normal    ASSESSMENT:  1. Health Care Maintenance: Normal Physical Exam  2. Fear of Flying  3. Osteopenia\  4. Hx of HCV - treated in early 2000s; most recent negative viral load in 2013.     PLAN:  1.Routine follow up in one year.  2. Vit D, HCV Ab/RNA/Genotype, CBC, BMP, Total testosterone  3. FIT test  4. R/F diazepam 5mg PO every day prn flight anxiety #10, PDMP checked and no issues  5. Repeat DEXA scan next year  6. Continue Vitamin D and Calcium supplements.     Results cited below have been reviewed and communicated to  patient.    ADDENDUM:  Called patient 5/18 and discussed further workup/management of increased fractures. Steve was wondering what other workup would be possible to figure out if anything else may be contributing to his fractures. Counseled Steve that his age is more likely to be biggest contributor to his osteopenia and labs such as repeat calcium and PTH levels were not of high yield; his mildly elevated calcium of 10.3 is likely due to his calcium supplements as well. Considering 3 fractures in past few years with osteopenia in a physically active person, I thought it was reasonable to pursue repeat calcium and PTH levels. Steve will try to reschedule for an office appointment to discuss workup and labs 5/19 before his trip on 5/20.

## 2021-05-14 NOTE — PATIENT INSTRUCTIONS
Thank you for coming into the clinic today!  Here is what we discussed:    Expect to be contacted regarding results today    Make a visit as your schedule permits to reassess for OMT    We refilled Valium 5mg for 10 tablets, take as needed for flight anxiety    Continue calcium and Vit D supplements     Please follow up in 1 year for repeat physical. Follow up earlier as needed for exam.     If you have any questions, please call the clinic at [phone].     Preventive Health Recommendations  Male Ages 50 - 64    Yearly exam:             See your health care provider every year in order to  o   Review health changes.   o   Discuss preventive care.    o   Review your medicines if your doctor has prescribed any.     Have a cholesterol test every 5 years, or more frequently if you are at risk for high cholesterol/heart disease.     Have a diabetes test (fasting glucose) every three years. If you are at risk for diabetes, you should have this test more often.     Have a colonoscopy at age 50, or have a yearly FIT test (stool test). These exams will check for colon cancer.      Talk with your health care provider about whether or not a prostate cancer screening test (PSA) is right for you.    You should be tested each year for STDs (sexually transmitted diseases), if you re at risk.     Shots: Get a flu shot each year. Get a tetanus shot every 10 years.     Nutrition:    Eat at least 5 servings of fruits and vegetables daily.     Eat whole-grain bread, whole-wheat pasta and brown rice instead of white grains and rice.     Get adequate Calcium and Vitamin D.     Lifestyle    Exercise for at least 150 minutes a week (30 minutes a day, 5 days a week). This will help you control your weight and prevent disease.     Limit alcohol to one drink per day.     No smoking.     Wear sunscreen to prevent skin cancer.     See your dentist every six months for an exam and cleaning.     See your eye doctor every 1 to 2 years.

## 2021-05-17 ENCOUNTER — OFFICE VISIT - HEALTHEAST (OUTPATIENT)
Dept: PHYSICAL THERAPY | Facility: REHABILITATION | Age: 61
End: 2021-05-17

## 2021-05-17 DIAGNOSIS — M79.671 RIGHT FOOT PAIN: ICD-10-CM

## 2021-05-17 DIAGNOSIS — M21.371 FOOT DROP, RIGHT FOOT: ICD-10-CM

## 2021-05-17 LAB
25(OH)D3 SERPL-MCNC: 48.6 NG/ML (ref 30–80)
HCV AB SER QL: POSITIVE
TESTOST SERPL-MCNC: 642 NG/DL (ref 221–716)

## 2021-05-19 ENCOUNTER — OFFICE VISIT - HEALTHEAST (OUTPATIENT)
Dept: PHYSICAL THERAPY | Facility: REHABILITATION | Age: 61
End: 2021-05-19

## 2021-05-19 DIAGNOSIS — M79.671 RIGHT FOOT PAIN: ICD-10-CM

## 2021-05-19 DIAGNOSIS — Z00.00 ROUTINE GENERAL MEDICAL EXAMINATION AT A HEALTH CARE FACILITY: ICD-10-CM

## 2021-05-19 DIAGNOSIS — M21.371 FOOT DROP, RIGHT FOOT: ICD-10-CM

## 2021-05-19 LAB
CALCIUM SERPL-MCNC: 9.6 MG/DL (ref 8.5–10.1)
PARATHYROID HORMONE: 55 PG/ML (ref 10–86)

## 2021-05-19 PROCEDURE — 82310 ASSAY OF CALCIUM: CPT

## 2021-05-19 PROCEDURE — 36415 COLL VENOUS BLD VENIPUNCTURE: CPT

## 2021-05-20 DIAGNOSIS — Z00.00 ROUTINE GENERAL MEDICAL EXAMINATION AT A HEALTH CARE FACILITY: ICD-10-CM

## 2021-05-20 LAB — HEMOCCULT STL QL IA: NEGATIVE

## 2021-05-20 PROCEDURE — G0328 FECAL BLOOD SCRN IMMUNOASSAY: HCPCS

## 2021-05-21 LAB
HCV RNA SERPL NAA+PROBE-ACNC: NORMAL [IU]/ML
HCV RNA SERPL NAA+PROBE-LOG IU: NORMAL LOG IU/ML

## 2021-05-26 LAB — HCV GENTYP SERPL NAA+PROBE: NORMAL

## 2021-06-05 VITALS
DIASTOLIC BLOOD PRESSURE: 64 MMHG | RESPIRATION RATE: 16 BRPM | SYSTOLIC BLOOD PRESSURE: 100 MMHG | HEART RATE: 61 BPM | HEIGHT: 69 IN | OXYGEN SATURATION: 96 % | BODY MASS INDEX: 23.55 KG/M2 | WEIGHT: 159 LBS

## 2021-06-14 DIAGNOSIS — E78.5 HYPERLIPIDEMIA LDL GOAL <70: ICD-10-CM

## 2021-06-15 RX ORDER — ATORVASTATIN CALCIUM 40 MG/1
40 TABLET, FILM COATED ORAL DAILY
Qty: 90 TABLET | Refills: 1 | Status: SHIPPED | OUTPATIENT
Start: 2021-06-15 | End: 2022-02-07

## 2021-06-15 NOTE — PATIENT INSTRUCTIONS - HE
- Call us for a stress test when you are able to walk on a treadmill    - Call us if you have dizziness or palpitations or if you pass out again    - See me in 1 year

## 2021-06-17 NOTE — PROGRESS NOTES
[facility] Daily Progress     Patient Name: Steve Jin  Date: 5/13/2021  Visit #: 5/7  PTA visit #:    Referral Diagnosis: Right foot drop   Referring provider: Tato Holloway, *  Visit Diagnosis:     ICD-10-CM    1. Right foot pain  M79.671    2. Foot drop, right foot  M21.371      Precautions: s/p Steve's fracture with surgery on March 1st, 2021     Assessment:   From Evaluation: Pt is a 60 y.o. year old male with right  foot drop s/p Steve's fracture with surgical repair.  Pt fractured 5th metatarsal in January. Pt did have imaging at the time but the fracture was not identified. 3 weeks later pt returned for imaging - was given a CAM boot and had surgery March 1st. Drop foot started 4 weeks later. Pt worse the CAM boot for 8 weeks post surgery and stopped wearing it yesterday.  Pt does have an EMG scheduled in a couple weeks do to ongoing numbness in anterior leg and dorsal aspect of foot. Patient has difficulty with walking and would like to return to biking and hiking secondary to foot drop. Clinical findings include DF weakness on R.  Patient appears motivated to participate in Physical Therapy and present with a good Physical Therapy prognosis for resolution of activities limitations.   Patient demonstrates understanding/independence with home program.  Patient is benefitting from skilled physical therapy and is making steady progress toward functional goals.  Patient is appropriate to continue with skilled physical therapy intervention, as indicated by initial plan of care.  10 weeks post-surgical: Pt continues to have weakness in R DF but did improve from 2-3 to 5 (with use of NMES).     Goal Status:  Pt. will be independent with home exercise program in : Comment  Comment:: 3 weeks    Pt will: walk around block with less difficulty in 3 weeks.      Plan / Patient Education:     Continue with initial plan of care.  Progress with home program as tolerated.   Plan for next session: NMES     SLS on foam       Subjective:   Had EMG yesterday - [facility] - L5 lumbar nerve - follow up with surgeon on 19th.     Low back pain for the last couple weeks which is improving with chiropractic.      Objective:   Exercises:  Exercise #1: SLS 20 seconds 2-3 reps - added standing on foam/pillow  Comment #1: Gastroc stretch against wall hold 30 seconds - added soleus stretch  Exercise #2: heel raises - double x10 - increased weight throught R leg  Comment #2: DF strengthening with YTB (trialed GTB with increased difficulty) 2x10-15 reps  Exercise #3: Eversion/Inversion: trialed GTB - use OTB to start 2-3 sets 10-15 reps  *use PTB with AROM for DF - PF with PTB     Counterstrain:   PLL 4 on L and 5 on R   EPIL5 on L       DF: L 11 degrees   10 degrees on R with use of TENS unit   Continues to be limited to 2-3 degrees without NMES    Past session: Bops board in sitting and standing: forward and backward - x10 each     Would like to get back to Kayaking, hiking and biking.     Treatment Today     TREATMENT MINUTES COMMENTS   Evaluation     Self-care/ Home management     Manual therapy 10 See above    Neuromuscular Re-education  See above    Therapeutic Activity     Therapeutic Exercises  See above    Gait training     Modality______NMES_____ 10+5 min set up  2 electrodes placed on tibialis anterior - 38-41mA   10 min 10 seconds on 10 off               Total 25    Blank areas are intentional and mean the treatment did not include these items.       Yazmin Campoverde, PT   5/13/2021

## 2021-06-17 NOTE — PROGRESS NOTES
[facility] Daily Progress     Patient Name: Steve Jin  Date: 5/17/2021  Visit #: 6/7  PTA visit #:    Referral Diagnosis: Right foot drop   Referring provider: Tato Holloway, *  Visit Diagnosis:     ICD-10-CM    1. Right foot pain  M79.671    2. Foot drop, right foot  M21.371      Precautions: s/p Steve's fracture with surgery on March 1st, 2021     Assessment:   From Evaluation: Pt is a 60 y.o. year old male with right  foot drop s/p Steve's fracture with surgical repair.  Pt fractured 5th metatarsal in January. Pt did have imaging at the time but the fracture was not identified. 3 weeks later pt returned for imaging - was given a CAM boot and had surgery March 1st. Drop foot started 4 weeks later. Pt worse the CAM boot for 8 weeks post surgery and stopped wearing it yesterday.  Pt does have an EMG scheduled in a couple weeks do to ongoing numbness in anterior leg and dorsal aspect of foot. Patient has difficulty with walking and would like to return to biking and hiking secondary to foot drop. Clinical findings include DF weakness on R.  Patient appears motivated to participate in Physical Therapy and present with a good Physical Therapy prognosis for resolution of activities limitations.   Patient demonstrates understanding/independence with home program.  Patient is benefitting from skilled physical therapy and is making steady progress toward functional goals.  Patient is appropriate to continue with skilled physical therapy intervention, as indicated by initial plan of care.  11 weeks post-surgical: Pt continues to have weakness in R DF but did improve from 2-3 to 5 (with use of NMES).     Goal Status:  Pt. will be independent with home exercise program in : Comment  Comment:: 3 weeks    Pt will: walk around block with less difficulty in 3 weeks.      Plan / Patient Education:     Continue with initial plan of care.  Progress with home program as tolerated.   Plan for next session: NMES    SLS on foam     Subjective:   Had EMG- [facility] - L5 lumbar nerve - follow up with surgeon on 19th.     Low back pain for the last couple weeks which is improving with chiropractic.    Continues to have low back pain.    Pain in foot with ambulating - pt has been walking 10,000 steps per day.     Objective:   Exercises:  Exercise #1: SLS 20 seconds 2-3 reps - added standing on foam/pillow  Comment #1: Gastroc stretch against wall hold 30 seconds - added soleus stretch  Exercise #2: heel raises - double x10 - increased weight throught R leg  Comment #2: DF strengthening with YTB (trialed GTB with increased difficulty) 2x10-15 reps  Exercise #3: Eversion/Inversion: trialed GTB - use OTB to start 2-3 sets 10-15 reps  Comment #3: hip abduction 2 sets 10-15 reps with OTB - squat with OTB 2 sets 10-15 reps    *use PTB with AROM for DF - PF with PTB   Has been very careful about standing on one leg - has started to do tree pose.   45 min a day - HEP.     DF: L 11 degrees   10 degrees on R with use of TENS unit   Continues to be limited to 2-3 degrees without NMES    Past session: Bops board in sitting and standing: forward and backward - x10 each     Would like to get back to Kayaking, hiking and biking.     Treatment Today     TREATMENT MINUTES COMMENTS   Evaluation     Self-care/ Home management     Manual therapy  See above    Neuromuscular Re-education  See above    Therapeutic Activity     Therapeutic Exercises 11 See above    Gait training     Modality______NMES_____ 10+4 min set up  2 electrodes placed on tibialis anterior - 38-41mA   10 min 10 seconds on 10 off               Total 25    Blank areas are intentional and mean the treatment did not include these items.       Yazmin Campoverde, PT   5/17/2021

## 2021-06-17 NOTE — PROGRESS NOTES
[facility] Daily Progress     Patient Name: Steve Jin  Date: 5/5/2021  Visit #: 2/7  PTA visit #:    Referral Diagnosis: Right foot drop   Referring provider: Tato Holloway, *  Visit Diagnosis:     ICD-10-CM    1. Right foot pain  M79.671    2. Foot drop, right foot  M21.371      Precautions: s/p Steve's fracture with surgery on March 1st, 2021     Assessment:   From Evaluation: Pt is a 60 y.o. year old male with right  foot drop s/p Steve's fracture with surgical repair.  Pt fractured 5th metatarsal in January. Pt did have imaging at the time but the fracture was not identified. 3 weeks later pt returned for imaging - was given a CAM boot and had surgery March 1st. Drop foot started 4 weeks later. Pt worse the CAM boot for 8 weeks post surgery and stopped wearing it yesterday.  Pt does have an EMG scheduled in a couple weeks do to ongoing numbness in anterior leg and dorsal aspect of foot. Patient has difficulty with walking and would like to return to biking and hiking secondary to foot drop. Clinical findings include DF weakness on R.  Patient appears motivated to participate in Physical Therapy and present with a good Physical Therapy prognosis for resolution of activities limitations.   Patient demonstrates understanding/independence with home program.  Patient is benefitting from skilled physical therapy and is making steady progress toward functional goals.  Patient is appropriate to continue with skilled physical therapy intervention, as indicated by initial plan of care.  Pt continues to have weakness in R DF.     Goal Status:  Pt. will be independent with home exercise program in : Comment  Comment:: 3 weeks    Pt will: walk around block with less difficulty in 3 weeks.      Plan / Patient Education:     Continue with initial plan of care.  Progress with home program as tolerated.   Plan for next session: NMES     Subjective:   Pt caught foot when walking in to the clinic today -  The pt returned a call and per NP, was informed with her negative GC/Ch lab results from 05-. The pap is still in the process.   No further questions/concerns at this moment.    foot drop   Minimal to no pain when he woke up this morning.  Less tingling/numbness   Pain Ratin - sore with walking 2-3/10    Objective:   Exercises:  Exercise #1: SLS 20 seconds 2-3 reps - added standing on foam/pillow  Comment #1: Gastroc stretch against wall hold 30 seconds - added soleus stretch  Exercise #2: heel raises - double x10 - increased weight throught R leg  Comment #2: DF strengthening with YTB (trialed GTB with increased difficulty) 2x10-15 reps  Exercise #3: Eversion/Inversion: trialed GTB - use OTB to start 2-3 sets 10-15 reps    MMT: hip abduction R: 4  L: 4+     Would like to get back to Kayaking, hiking and biking.     Treatment Today     TREATMENT MINUTES COMMENTS   Evaluation     Self-care/ Home management     Manual therapy 10  talocrural joint distraction, posterior glide grade III-IV to improve DF. Subtalar medial and lateral glide   General mobs to metatarsals    Neuromuscular Re-education     Therapeutic Activity     Therapeutic Exercises 15 See above    Gait training     Modality__________________                Total 25    Blank areas are intentional and mean the treatment did not include these items.       Yazmin Campoverde, PT   2021

## 2021-06-17 NOTE — PROGRESS NOTES
[facility] Daily Progress     Patient Name: Steve Jin  Date: 5/7/2021  Visit #: 3/7  PTA visit #:    Referral Diagnosis: Right foot drop   Referring provider: Tato Holloway, *  Visit Diagnosis:     ICD-10-CM    1. Right foot pain  M79.671    2. Foot drop, right foot  M21.371      Precautions: s/p Steve's fracture with surgery on March 1st, 2021     Assessment:   From Evaluation: Pt is a 60 y.o. year old male with right  foot drop s/p Steve's fracture with surgical repair.  Pt fractured 5th metatarsal in January. Pt did have imaging at the time but the fracture was not identified. 3 weeks later pt returned for imaging - was given a CAM boot and had surgery March 1st. Drop foot started 4 weeks later. Pt worse the CAM boot for 8 weeks post surgery and stopped wearing it yesterday.  Pt does have an EMG scheduled in a couple weeks do to ongoing numbness in anterior leg and dorsal aspect of foot. Patient has difficulty with walking and would like to return to biking and hiking secondary to foot drop. Clinical findings include DF weakness on R.  Patient appears motivated to participate in Physical Therapy and present with a good Physical Therapy prognosis for resolution of activities limitations.   Patient demonstrates understanding/independence with home program.  Patient is benefitting from skilled physical therapy and is making steady progress toward functional goals.  Patient is appropriate to continue with skilled physical therapy intervention, as indicated by initial plan of care.  Pt continues to have weakness in R DF but did improve from -2 to 5 (with use of NMES).     Goal Status:  Pt. will be independent with home exercise program in : Comment  Comment:: 3 weeks    Pt will: walk around block with less difficulty in 3 weeks.      Plan / Patient Education:     Continue with initial plan of care.  Progress with home program as tolerated.   Plan for next session: NMES   Review heel raises, SLS  on foam   Ask about left sided back pain     Subjective:   Low back pain for the last couple weeks.   Retired yesterday.     Objective:   Exercises:  Exercise #1: SLS 20 seconds 2-3 reps - added standing on foam/pillow  Comment #1: Gastroc stretch against wall hold 30 seconds - added soleus stretch  Exercise #2: heel raises - double x10 - increased weight throught R leg  Comment #2: DF strengthening with YTB (trialed GTB with increased difficulty) 2x10-15 reps  Exercise #3: Eversion/Inversion: trialed GTB - use OTB to start 2-3 sets 10-15 reps    DF: L 11 degrees   5 decrease on R     Would like to get back to Kayaking, hiking and biking.     Treatment Today     TREATMENT MINUTES COMMENTS   Evaluation     Self-care/ Home management     Manual therapy 8 Counterstrain ALL and PLL scan: PLL2 and 4 on right    Neuromuscular Re-education     Therapeutic Activity     Therapeutic Exercises  See above    Gait training     Modality______NMES_____ 5+10 min set up  2 electrodes placed on tibialis anterior - 38-40mA   5 min 10 seconds on 10 off               Total 23    Blank areas are intentional and mean the treatment did not include these items.       Yazmin Campoverde, PT   5/7/2021

## 2021-06-17 NOTE — PROGRESS NOTES
[facility] Daily Progress     Patient Name: Steve Jin  Date: 5/19/2021  Visit #: 7/7  PTA visit #:    Referral Diagnosis: Right foot drop   Referring provider: Tato Holloway, *  Visit Diagnosis:     ICD-10-CM    1. Right foot pain  M79.671    2. Foot drop, right foot  M21.371      Precautions: s/p Steve's fracture with surgery on March 1st, 2021     Assessment:   From Evaluation: Pt is a 60 y.o. year old male with right  foot drop s/p Steve's fracture with surgical repair.  Pt fractured 5th metatarsal in January. Pt did have imaging at the time but the fracture was not identified. 3 weeks later pt returned for imaging - was given a CAM boot and had surgery March 1st. Drop foot started 4 weeks later. Pt worse the CAM boot for 8 weeks post surgery and stopped wearing it yesterday.  Pt does have an EMG scheduled in a couple weeks do to ongoing numbness in anterior leg and dorsal aspect of foot. Patient has difficulty with walking and would like to return to biking and hiking secondary to foot drop. Clinical findings include DF weakness on R.  Patient appears motivated to participate in Physical Therapy and present with a good Physical Therapy prognosis for resolution of activities limitations.     Patient demonstrates understanding/independence with home program.  Patient is benefitting from skilled physical therapy and is making steady progress toward functional goals.  Patient is appropriate to continue with skilled physical therapy intervention, as indicated by initial plan of care.     11 weeks post-surgical: Pt continues to have weakness in R DF but did improve from 2-3 to 5 (with use of NMES).     Goal Status: MET   Pt. will be independent with home exercise program in : Comment  Comment:: 3 weeks    Pt will: walk around block with less difficulty in 3 weeks.      Plan / Patient Education:     discharge - pt is heading out of town until November.     Subjective:   Had EMG- [facility] - L5 lumbar nerve - follow up with surgeon on today.     Low back pain for the last couple weeks which is improving with chiropractic.    Continues to have low back pain.    Pain in foot with ambulating - pt has been walking 10,000 steps per day.     Objective:   Exercises:  Exercise #1: SLS 20 seconds 2-3 reps - added standing on foam/pillow  Comment #1: Gastroc stretch against wall hold 30 seconds - added soleus stretch  Exercise #2: heel raises - double x10 - increased weight throught R leg  Comment #2: DF strengthening with YTB (trialed GTB with increased difficulty) 2x10-15 reps  Exercise #3: Eversion/Inversion: trialed GTB - use OTB to start 2-3 sets 10-15 reps  Comment #3: hip abduction 2 sets 10-15 reps with OTB - squat with OTB 2 sets 10-15 reps    *use PTB with AROM for DF - PF with PTB   Has been very careful about standing on one leg - has started to do tree pose.   45 min a day - HEP.     DF: L 11 degrees   10 degrees on R with use of TENS unit   Continues to be limited to 2-3 degrees without NMES      Would like to get back to Kayaking, hiking and biking.   Pt did get on his bike to go around the block.     Treatment Today     TREATMENT MINUTES COMMENTS   Evaluation     Self-care/ Home management     Manual therapy 8 Talocrural: traction for general mobility   Calcaneous lateral mobs for improved inversion.       Neuromuscular Re-education  See above    Therapeutic Activity     Therapeutic Exercises  See above    Gait training 5 Mild foot drop. Cues for heel strike - roll through to toe off.   Stairs: normal - no difficulty with stairs. Cross-overs - recommended retro gait.    Modality______NMES_____ 10+4 min set up  2 electrodes placed on tibialis anterior - 38-41mA   10 min 10 seconds on 10 off               Total 27    Blank areas are intentional and mean the treatment did not include these items.       Yazmin Campoverde, PT   5/19/2021     [facility] Discharge  Summary  Patient Name: Steve Kowalskio  Date: 5/21/2021  Referral Diagnosis: Right foot drop   Referring provider: Tato Holloway, *  Visit Diagnosis:   1. Right foot pain     2. Foot drop, right foot         Goals:  Pt. will be independent with home exercise program in : Comment  Comment:: 3 weeks    Pt will: walk around block with less difficulty in 3 weeks.      Patient was seen for 7 visits from 4/29/21 to 5/19/21 with 0 missed appointments.  The patient met goals and has demonstrated understanding of and independence in the home program for self-care, and progression to next steps.  He will initiate contact if questions or concerns arise.    Therapy will be discontinued at this time.  The patient will need a new referral to resume.    Thank you for your referral.  Yazmin Campoverde  5/21/2021  11:26 AM

## 2021-06-17 NOTE — PROGRESS NOTES
[facility] Daily Progress     Patient Name: Steve Jin  Date: 5/11/2021  Visit #: 4/7  PTA visit #:    Referral Diagnosis: Right foot drop   Referring provider: Tato Holloway, *  Visit Diagnosis:     ICD-10-CM    1. Right foot pain  M79.671    2. Foot drop, right foot  M21.371      Precautions: s/p Steve's fracture with surgery on March 1st, 2021     Assessment:   From Evaluation: Pt is a 60 y.o. year old male with right  foot drop s/p Steve's fracture with surgical repair.  Pt fractured 5th metatarsal in January. Pt did have imaging at the time but the fracture was not identified. 3 weeks later pt returned for imaging - was given a CAM boot and had surgery March 1st. Drop foot started 4 weeks later. Pt worse the CAM boot for 8 weeks post surgery and stopped wearing it yesterday.  Pt does have an EMG scheduled in a couple weeks do to ongoing numbness in anterior leg and dorsal aspect of foot. Patient has difficulty with walking and would like to return to biking and hiking secondary to foot drop. Clinical findings include DF weakness on R.  Patient appears motivated to participate in Physical Therapy and present with a good Physical Therapy prognosis for resolution of activities limitations.   Patient demonstrates understanding/independence with home program.  Patient is benefitting from skilled physical therapy and is making steady progress toward functional goals.  Patient is appropriate to continue with skilled physical therapy intervention, as indicated by initial plan of care.  Pt continues to have weakness in R DF but did improve from -2 to 5 (with use of NMES).     Goal Status:  Pt. will be independent with home exercise program in : Comment  Comment:: 3 weeks    Pt will: walk around block with less difficulty in 3 weeks.      Plan / Patient Education:     Continue with initial plan of care.  Progress with home program as tolerated.   Plan for next session: NMES    SLS on foam        Subjective:   Low back pain for the last couple weeks which is improving with chiropractic.      Objective:   Exercises:  Exercise #1: SLS 20 seconds 2-3 reps - added standing on foam/pillow  Comment #1: Gastroc stretch against wall hold 30 seconds - added soleus stretch  Exercise #2: heel raises - double x10 - increased weight throught R leg  Comment #2: DF strengthening with YTB (trialed GTB with increased difficulty) 2x10-15 reps  Exercise #3: Eversion/Inversion: trialed GTB - use OTB to start 2-3 sets 10-15 reps  *use PTB with AROM for DF - PF with PTB     DF: L 11 degrees   10 degrees on R with use of TENS unit   Continues to be limited to 2-3 degrees without NMES    Bops board in sitting and standing: forward and backward - x10 each     Would like to get back to Kayaking, hiking and biking.     Treatment Today     TREATMENT MINUTES COMMENTS   Evaluation     Self-care/ Home management     Manual therapy Not today  Counterstrain ALL and PLL scan: PLL2 and 4 on right    Neuromuscular Re-education 8 See above    Therapeutic Activity     Therapeutic Exercises  See above    Gait training     Modality______NMES_____ 10+5 min set up  2 electrodes placed on tibialis anterior - 38-41mA   10 min 10 seconds on 10 off               Total 23    Blank areas are intentional and mean the treatment did not include these items.       Yazmin Campoverde, PT   5/11/2021

## 2021-06-17 NOTE — PROGRESS NOTES
[facility]   [facility]-Pelvic Initial Evaluation    Patient Name: Steve Jin  Date of evaluation: 4/29/2021  Referral Diagnosis:  Right Foot drop   Referring provider: Tato Holloway, *  Visit Diagnosis:     ICD-10-CM    1. Right foot pain  M79.671    2. Foot drop, right foot  M21.371      Precautions: s/p Steve's fracture with surgery on March 1st, 2021     Assessment:   Pt is a 60 y.o. year old male with right  foot drop s/p Steve's fracture with surgical repair.  Pt fractured 5th metatarsal in January. Pt did have imaging at the time but the fracture was not identified. 3 weeks later pt returned for imaging - was given a CAM boot and had surgery March 1st. Drop foot started 4 weeks later. Pt worse the CAM boot for 8 weeks post surgery and stopped wearing it yesterday.  Pt does have an EMG scheduled in a couple weeks do to ongoing numbness in anterior leg and dorsal aspect of foot. Patient has difficulty with walking and would like to return to biking and hiking secondary to foot drop. Clinical findings include DF weakness on R.  Patient appears motivated to participate in Physical Therapy and present with a good Physical Therapy prognosis for resolution of activities limitations.        Pt. is appropriate for skilled PT intervention as outlined in the Plan of Care (POC).  Pt. is a good candidate for skilled PT services to improve pain levels and function.  Plan of care and goals were established in collaboration with patient.     Goals:  Pt. will be independent with home exercise program in : Comment  Comment:: 3 weeks    Pt will: walk around block with less difficulty in 3 weeks.      Patient goals: regain full function of right foot.     Patient's expectations/goals are realistic.    Barriers to Learning or Achieving Goals:  No Barriers.       Plan / Patient Instructions:        Plan of Care:   Authorization / Certification Start Date: 04/29/21  Authorization / Certification End Date:  21  Authorization / Certification Number of Visits: 7 (pt is traveling to [place] for the summer)  Patient Related Instruction: Nature of Condition;Treatment plan and rationale;Self Care instruction;Basis of treatment;Next steps  Times per Week: 2  Number of Weeks: 3  Number of Visits: 7  Discharge Planning: met goals  Therapeutic Exercise: ROM;Stretching;Strengthening  Neuromuscular Reeducation: kinesio tape;balance/proprioception  Manual Therapy: soft tissue mobilization;myofascial release;joint mobilization;strain counterstrain  Gait Training: normalize  Equipment: theraband      Plan for next visit: inversion, eversion, PF with band   Review HEP   Counterstrain?   STM to gastroc   Progress to SLS on foam, eccentric lower heel raises, wobble board, monster walk, stairs, lunge, Bosu ball, squat   2 min walk test   Check hip strength        Subjective:         Social information:   Occupation:psychotherapist    Work Status:Working full time   Equipment Available: None - was wearing a CAM boot     History of Present Illness:    Steve is a 60 y.o. male who presents to therapy today with complaints of Naj[place] fracture -  broken 5th metatarsal. Pt broke his foot in January - had an x-ray but the fracture was not seen on surgery.  3 weeks later - after walkin on foot fracture had gotten worse. Date of onset/duration of symptoms is  surgery.  Right foot drop started about a month later.    Got out of CAM boot yesterday - for 8 weeks.     Pain Ratin - mild pain to walk on  Pain rating at best: 0  Pain rating at worst: 4  Pain description: sore  - tired , numbness/tingly   Numbness lower leg and dorsal aspect of foot     EMG: May 12th  Return to [place] on May 19th   Pt has had some pain in low back with wearing CAM boot and using crutches. L4-L5 radicular symptoms?     Functional limitations are described as occurring with:   Walk: could walk 1 mile if he needed to but can only walk around the block currently.    Would like to return to biking.     Exercise: 3-5x a week, biking, cross country skiing,   Has not been to the gym in over a year - use to do weight lifting.   Goals: bike 100 miles in a day, hiking all day, cross country ski   Is going to [place] May 20th - for the summer.     Last x-ray was performed yesterday. Fracture is continuing to heal.        Objective:      Note: Items left blank indicates the item was not performed or not indicated at the time of the evaluation.    Patient Outcome Measures :  LEFS 45  No data recorded   Scores range from 0-100%, where a score of 0% represents minimal pain and maximal function. The minimal clinically important difference is a score reduction of 12%.    Examination  1. Right foot pain     2. Foot drop, right foot       Involved side: Right  Posture Observation:      General sitting posture is  fair.    Lumbar ROM:    Date: 4/29/2021     *Indicate scale AROM AROM AROM   Lumbar Flexion WFL      Lumbar Extension 50% limited but no pain       Right Left Right Left Right Left   Lumbar Sidebending         Lumbar Rotation         Thoracic Flexion      Thoracic Extension      Thoracic Sidebending         Thoracic Rotation           Inversion on R: 4-  Eversion on R: 4+   DF on R: unable to lift through full ROM.   PF unable to lift through full ROM     L side WFL: 5/5     DF ROM: 10 on L, lacking 2-3 degrees on R     Lower Extremity Strength:     Date: 4/29/2021     LE strength/5 Right Left Right Left Right Left   Hip Flexion (L1-3) 4 4       Hip Extension (L5-S1)         Hip Abduction (L4-5)         Hip Adduction (L2-3)         Hip External Rotation         Hip Internal Rotation         Knee Extension (L3-4) 4+ 4+       Knee Flexion 4+ 4+       Ankle Dorsiflexion (L4-5) Unable to  Lift through full ROM but does have 3+-4/5 strength  5       Great Toe Extension (L5)         Ankle Plantar flexion (S1) 2  5        Abdominals        Sensation    Not tested        Reflex  Testing  Lumbar Dermatomes Right Left UE Reflexes Right Left   Iliac Crest and Groin (L1)   Biceps (C5-6)     Anterior Medial Thigh (L2)   Brachioradialis (C5-6)     Anterior Thigh, Medial Epicondyle Femur (L3)   Triceps (C7-8)     Lateral Thigh, Anterior Knee, Medial Leg/Malleolus (L4)   Eduard s test (SC compression)   Flick middle finger = thumb flexion     Lateral Leg, Dorsal Foot (L5)   LE Reflexes     Lateral Foot (S1)   Patellar (L3-4)     Posterior Leg (S2)   Achilles (S1-2)     Other:   Babinski Response       Palpation: not tested, minimal swelling lateral R foot around incision       Lumbar Special Tests:     Lumbar Special Tests Right Left SI Tests Right  Left   Quadrant test   SI Compression- side lying      Straight leg raise -  -  SI Distraction - supine     Distraction    POSH Test - thigh thrust      Slump -  -  Sacral Thrust     Sit-up test  Gaenslen's test (hip flex and hip ext - press)      Prone instability test  FADIR     Trunk extensor endurance test  Femoral Nerve Tension (prone)       Pubic shotgun  Resisted Abduction in supine       FADER       Repeated Motion Testing:  Not tested     Passive Mobility - Joint Integrity: Spring Testing  Not tested     LE Screen:  Not tested     Treatment Today     TREATMENT MINUTES COMMENTS   Evaluation 33 -LE-educated on POC, diagnosis, relevant anatomy, basis for treatment and HEP  Did discuss peroneal nerve with pt - use of anatomy book.     Self-care/ Home management     Manual therapy     Neuromuscular Re-education     Therapeutic Activity     Therapeutic Exercises 23 -see exercise flow sheet     Gait training     Modality__________________                Total 56    Blank areas are intentional and mean the treatment did not include these items.     PT Evaluation Code: (Please list factors)  Patient History/Comorbidities: see above  Examination: see above  Clinical Presentation: stable  Clinical Decision Making: low     Patient History/  Comorbidities  Examination  (body structures and functions, activity limitations, and/or participation restrictions) Clinical Presentation Clinical Decision Making (Complexity)   No documented Comorbidities or personal factors 1-2 Elements Stable and/or uncomplicated Low   1-2 documented comorbidities or personal factor 3 Elements Evolving clinical presentation with changing characteristics Moderate   3-4 documented comorbidities or personal factors 4 or more Unstable and unpredictable High                Yazmin Campoverde, PT, DPT  4/29/2021  8:06 AM

## 2021-07-04 NOTE — CONFIDENTIAL NOTE
Confidential Note by Kiana Perez MD at 3/12/2021  3:30 PM     Author: Kiana Perez MD Service: -- Author Type: Physician    Filed: 3/12/2021  5:19 PM Encounter Date: 3/12/2021 Status: Signed    : Kiana Perez MD (Physician)         Thank you, Dr. Holloway, for asking the [facility] team to see Mr. Steve Jin to evaluate heart disease.      Assessment/Recommendations   Assessment/Plan:    Syncope - concerning for cardiac cause but echo and SHAINA looked good. We discussed a loop recorder vs continued monitoring given his lack of symptoms in the past year and he wishes to watch things for now but will call with any dizziness, palpitations or syncope.    Coronary disease - he reports the [facility] CTA showed a 50% proximal LAD lesion. I recommend an exercise stress echo once his foot is healed to rule out underlying ischemia, especially given his syncope and his brother's recent sudden death. I recommended an aspirin 81mg daily and continued statin therapy.    Mixed hyperlipidemia - LDL at goal on atorvastatin 40mg daily. He is on a healthy vegetarian diet.     F/U 1 year       History of Present Illness/Subjective    Mr. 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 Steve has a sudden syncopal event in 2020 while sitting at work. There was no prodrome and he did hit his head. He was admitted at [facility] and EKG showed RBBB and bradycardia, echo was grossly normal, a coronary CTA showed a calcium score of 375 and mild-moderate coronary disease. A 30 day shaina showed sinus bradycardia with the RBBB and PACs.  He had followed with Dr. Herman at [facility] but comes to establish care here today. He notes that his older brother  suddenly last week while skiing with his daughter. His brother was very much like him in health and physicality.    Steve recently had right foot  surgery for a stress fracture and is wearing a boot for that. He has had several fractures during his life. Typically he is very active outdoors, biking, hiking, kayaking, etc. There is occasional fatigue/dyspnea with activity. He did, a year ago, have some dyspnea and chest discomfort while in [place] and Dr. Herman recommended a stress test, but Steve had done a cardiopulmonary test at [facility] that looked good, so they did not do the stress test.        Physical Examination Review of Systems   Vitals:    03/12/21 1544   BP: 100/64   Pulse: 61   Resp: 16   SpO2: 96%     Body mass index is 23.48 kg/m .  Wt Readings from Last 3 Encounters:   03/12/21 159 lb (72.1 kg)     [unfilled]  General Appearance:   no distress, normal body habitus   ENT/Mouth: membranes moist, no oral lesions or bleeding gums.      EYES:  no scleral icterus, normal conjunctivae   Neck: no carotid bruits or thyromegaly   Chest/Lungs:   lungs are clear to auscultation, no rales or wheezing, no sternal scar, equal chest wall expansion    Cardiovascular:   Regular. Normal first and second heart sounds with no murmurs, rubs, or gallops. The right radial, dorsalis pedis and posterior tibial pulses are intact.  The left radial, dorsalis pedis and posterior tibial pulses are intact. Jugular venous pressure flat, no edema bilaterally    Abdomen:  no organomegaly, masses, bruits, or tenderness; bowel sounds are present   Extremities: no cyanosis or clubbing   Skin: no xanthelasma, warm.    Neurologic: normal  bilateral, no tremors     Psychiatric: alert and oriented x3, calm     General: WNL  Eyes: WNL  Ears/Nose/Throat: WNL  Lungs: WNL  Heart: WNL  Stomach: WNL  Bladder: WNL  Muscle/Joints: WNL  Skin: WNL  Nervous System: WNL  Mental Health: WNL     Blood: WNL     Medical History  Surgical History Family History Social History   Past Medical History:   Diagnosis Date   ? Coronary artery disease    ? Hepatitis C    ? Hyperlipidemia    ?  Hypothyroid    ? RBBB    ? Syncope     Past Surgical History:   Procedure Laterality Date   ?  duodenal ulcer resection     ? FOOT FRACTURE SURGERY     ? KNEE ARTHROSCOPY     ? SHOULDER ARTHROSCOPY      Family History   Problem Relation Age of Onset   ? Valvular heart disease Mother    ? Sudden death Brother     Social History     Socioeconomic History   ? Marital status:      Spouse name: Not on file   ? Number of children: Not on file   ? Years of education: Not on file   ? Highest education level: Not on file   Occupational History   ? Not on file   Social Needs   ? Financial resource strain: Not on file   ? Food insecurity     Worry: Not on file     Inability: Not on file   ? Transportation needs     Medical: Not on file     Non-medical: Not on file   Tobacco Use   ? Smoking status: Former Smoker   ? Smokeless tobacco: Former User     Quit date: 1997   Substance and Sexual Activity   ? Alcohol use: Not Currently     Comment: Sober   ? Drug use: Never   ? Sexual activity: Not on file   Lifestyle   ? Physical activity     Days per week: Not on file     Minutes per session: Not on file   ? Stress: Not on file   Relationships   ? Social connections     Talks on phone: Not on file     Gets together: Not on file     Attends Yazdanism service: Not on file     Active member of club or organization: Not on file     Attends meetings of clubs or organizations: Not on file     Relationship status: Not on file   ? Intimate partner violence     Fear of current or ex partner: Not on file     Emotionally abused: Not on file     Physically abused: Not on file     Forced sexual activity: Not on file   Other Topics Concern   ? Not on file   Social History Narrative   ? Not on file          Medications  Allergies   Current Outpatient Medications   Medication Sig Dispense Refill   ? aspirin 81 MG EC tablet Take 81 mg by mouth daily.     ? atorvastatin (LIPITOR) 40 MG tablet Take by mouth daily.     ? ibuprofen (ADVIL,MOTRIN)  200 MG tablet Take 200-400 mg by mouth as needed.     ? levothyroxine (SYNTHROID, LEVOTHROID) 112 MCG tablet Take 112 mcg by mouth daily.       No current facility-administered medications for this visit.     Allergies   Allergen Reactions   ? Sulfa (Sulfonamide Antibiotics) Rash   ? Hydrocodone-Acetaminophen Other (See Comments)     Other reaction(s): Hearing/Audio Disturbances         Lab Results    Chemistry/lipid CBC Cardiac Enzymes/BNP/TSH/INR   No results found for: CHOL, HDL, LDLCALC, TRIG, CREATININE, BUN, K, NA, CL, CO2 No results found for: WBC, HGB, HCT, MCV, PLT Lab Results   Component Value Date    TSH 1.66 01/08/2021

## 2021-10-23 ENCOUNTER — HEALTH MAINTENANCE LETTER (OUTPATIENT)
Age: 61
End: 2021-10-23

## 2021-11-09 ENCOUNTER — TELEPHONE (OUTPATIENT)
Dept: FAMILY MEDICINE | Facility: CLINIC | Age: 61
End: 2021-11-09
Payer: COMMERCIAL

## 2021-11-09 NOTE — TELEPHONE ENCOUNTER
[facility] phone call message- general phone call:    Reason for call:    Pt previously spoke with Dr. Holloway about getting another bone density scan. He states they stated they were going to wait til the fall. Wondering if Dr. Holloway can place that referral.     Action desired:     Call back if appt is needed.     Return call needed: Yes    OK to leave a message on voice mail? Yes    Advised patient to response may take up to 2 business days: Yes    Primary language: English      needed? No    Call taken on November 9, 2021 at 9:58 AM by Andie Jarvis

## 2021-11-22 ENCOUNTER — OFFICE VISIT (OUTPATIENT)
Dept: FAMILY MEDICINE | Facility: CLINIC | Age: 61
End: 2021-11-22
Payer: COMMERCIAL

## 2021-11-22 VITALS
HEART RATE: 64 BPM | HEIGHT: 70 IN | RESPIRATION RATE: 16 BRPM | BODY MASS INDEX: 22.33 KG/M2 | DIASTOLIC BLOOD PRESSURE: 75 MMHG | TEMPERATURE: 97.8 F | SYSTOLIC BLOOD PRESSURE: 125 MMHG | WEIGHT: 156 LBS | OXYGEN SATURATION: 96 %

## 2021-11-22 DIAGNOSIS — Z23 HIGH PRIORITY FOR 2019-NCOV VACCINE: ICD-10-CM

## 2021-11-22 DIAGNOSIS — M54.17 LUMBOSACRAL RADICULOPATHY AT L5: ICD-10-CM

## 2021-11-22 DIAGNOSIS — N28.1 BILATERAL RENAL CYSTS: ICD-10-CM

## 2021-11-22 DIAGNOSIS — M85.80 OSTEOPENIA, UNSPECIFIED LOCATION: Primary | ICD-10-CM

## 2021-11-22 DIAGNOSIS — M43.16 ANTEROLISTHESIS OF LUMBAR SPINE: ICD-10-CM

## 2021-11-22 DIAGNOSIS — Q66.70 HIGH ARCHES: ICD-10-CM

## 2021-11-22 PROCEDURE — 99214 OFFICE O/P EST MOD 30 MIN: CPT | Mod: 25 | Performed by: STUDENT IN AN ORGANIZED HEALTH CARE EDUCATION/TRAINING PROGRAM

## 2021-11-22 PROCEDURE — 91300 COVID-19,PF,PFIZER (12+ YRS): CPT | Performed by: STUDENT IN AN ORGANIZED HEALTH CARE EDUCATION/TRAINING PROGRAM

## 2021-11-22 PROCEDURE — 0004A COVID-19,PF,PFIZER (12+ YRS): CPT | Performed by: STUDENT IN AN ORGANIZED HEALTH CARE EDUCATION/TRAINING PROGRAM

## 2021-11-22 RX ORDER — GABAPENTIN 100 MG/1
100 CAPSULE ORAL 3 TIMES DAILY
Qty: 60 CAPSULE | Refills: 0 | Status: SHIPPED | OUTPATIENT
Start: 2021-11-22 | End: 2022-05-16

## 2021-11-22 ASSESSMENT — MIFFLIN-ST. JEOR: SCORE: 1518.24

## 2021-11-22 NOTE — PATIENT INSTRUCTIONS
Patient Education     Living with Osteoporosis: Preventing Fractures  If you have osteoporosis, you can do a lot to reduce its effect on your life. Knowing how to prevent fractures and spinal curvature can help you live more comfortably and safely with this disease.    Reducing your risk for fractures  The most common fracture sites in people with osteoporosis are the wrist, spine, and hip. These fractures are often caused by accidents and falls. All fractures are painful and may limit what you can do. But hip fractures are very serious. They often need surgery, and it can take months to recover. To reduce your risk for fractures:    Get regular exercise. Try walking, swimming, or weight training.    Eat foods that are rich in calcium, or take calcium supplements.    Make your home safe to help avoid accidents.    Take extra precautions not to fall in risky areas, such as icy sidewalks.  Understanding spinal fractures  Your spine is made up of many bones called vertebrae. Osteoporosis can cause the vertebrae in your spine to collapse. As a result, your upper back may arch forward, creating a curvature. Spine fractures may also result from back strain and bad posture. You will also lose height. Your lower spine must then adjust to keep your body balanced. This can cause back pain. To prevent or lessen these spinal changes:    Practice good posture.    Use proper techniques if you need to lift heavy objects.    Do back exercises to help your posture.    Lie on your back when you have pain.    Ask your healthcare provider about these and other ways to help your spine.  Sena last reviewed this educational content on 5/1/2018 2000-2021 [employer]. All rights reserved. This information is not intended as a substitute for professional medical care. Always follow your healthcare professional's instructions.

## 2021-11-22 NOTE — PROGRESS NOTES
dexa  Assessment & Plan     Osteopenia, unspecified location  DEXA scan 2 years ago found osteopenia, patient does not have evidence of osteoporosis or any osteoporotic fracture history.  However repeat scan was advised 2 years after the last 1 and thus ordered as below.  - Dexa hip/pelvis/spine*; Future    Lumbosacral radiculopathy at L5  Given difficulty falling asleep with low back pain at night will trial gabapentin  - gabapentin (NEURONTIN) 100 MG capsule; Take 1 capsule (100 mg) by mouth 3 times daily    High arches  Referred as per patient request for orthotic fitting  - Orthopedic  Referral; Future    Anterolisthesis of lumbar spine  Referred for follow-up stairs to Ortho spine given clinical radicular symptoms and spondylolysis and anterolisthesis on MRI imaging  - Orthopedic  Referral; Future    Bilateral renal cysts  Ultrasound ordered for complete visualization of renal cysts.  Likely benign cysts but will investigate with ultrasound.  - US Renal Complete; Future    High priority for 2019-nCoV vaccine  - COVID-19,PF,PFIZER (12+ Yrs PURPLE LABEL)    Follow-up in 1 to 2 months for annual physical  Tato Holloway MD  [facility]   Steve is a 60 year old who presents for the following health issues     HPI     Steve comes in today to discuss repeat DEXA scan and review findings on MRI of the lumbar spine done in [place].  Steve is recently retired and spent the summer in [place].  He underwent DEXA scan 2 years ago due to having 3 fractures in 3 years, bone density scan 2 years ago showed osteopenia.  Patient has been on calcium and vitamin D supplementation since then.  His 3 fractures were in the rib, leg, and metatarsal.  None of them were osteoporotic fractures.    He had an MRI done over the summer due to radicular symptoms and back pain.  Has had right-sided drop foot.  He also has some low back pain especially when sleeping.  Had MRI  "imaging in [place].  His MRI impression was dictated into the chart below.    Results of spine imaging done at [facility] in [facility]  Impression: There is chronic appearing grade 1 anterolisthesis of L5 on S1 with bilateral L5 spondylolysis and degenerative endplate edema suggesting an element of segmental instability.  There is moderate to severe bilateral foraminal stenosis at L5-S1 which may worsen dramatically and can be correlated with bilateral L5 radicular symptoms.  Remainder of the lumbar spine is normal for the patient's age other than multilevel small Schmorl's nodes.      In addition to DEXA scanning and MRI results patient states that he is always had high arches and would like a referral to podiatry for fitting of orthotics.  Finally patient inquires about what follow-up might be necessary for incidental renal cysts incompletely visualized on MRI in [place].    Review of Systems   Constitutional, HEENT, cardiovascular, pulmonary, gi and gu systems are negative, except as otherwise noted.      Objective    /75 (BP Location: Left arm, Patient Position: Sitting, Cuff Size: Adult Regular)   Pulse 64   Temp 97.8  F (36.6  C) (Oral)   Resp 16   Ht 1.785 m (5' 10.28\")   Wt 70.8 kg (156 lb)   SpO2 96%   BMI 22.21 kg/m    Body mass index is 22.21 kg/m .  Physical Exam   GENERAL: healthy, alert and no distress  NECK: no adenopathy, no asymmetry, masses, or scars and thyroid normal to palpation  RESP: lungs clear to auscultation - no rales, rhonchi or wheezes  CV: regular rate and rhythm, normal S1 S2, no S3 or S4, no murmur, click or rub, no peripheral edema and peripheral pulses strong  ABDOMEN: soft, nontender, no hepatosplenomegaly, no masses and bowel sounds normal  MS: no gross musculoskeletal defects noted, no edema          "

## 2021-11-23 ENCOUNTER — OFFICE VISIT (OUTPATIENT)
Dept: PHYSICAL MEDICINE AND REHAB | Facility: CLINIC | Age: 61
End: 2021-11-23
Attending: STUDENT IN AN ORGANIZED HEALTH CARE EDUCATION/TRAINING PROGRAM
Payer: COMMERCIAL

## 2021-11-23 VITALS — SYSTOLIC BLOOD PRESSURE: 114 MMHG | OXYGEN SATURATION: 99 % | DIASTOLIC BLOOD PRESSURE: 70 MMHG | HEART RATE: 75 BPM

## 2021-11-23 DIAGNOSIS — M54.50 LUMBAR SPINE PAIN: Primary | ICD-10-CM

## 2021-11-23 DIAGNOSIS — M48.061 FORAMINAL STENOSIS OF LUMBAR REGION: ICD-10-CM

## 2021-11-23 DIAGNOSIS — M43.16 SPONDYLOLISTHESIS OF LUMBAR REGION: ICD-10-CM

## 2021-11-23 DIAGNOSIS — M25.651 DECREASED RANGE OF RIGHT HIP MOVEMENT: ICD-10-CM

## 2021-11-23 DIAGNOSIS — M54.2 CERVICALGIA: ICD-10-CM

## 2021-11-23 DIAGNOSIS — R20.2 PARESTHESIA OF BOTH HANDS: ICD-10-CM

## 2021-11-23 PROCEDURE — 99204 OFFICE O/P NEW MOD 45 MIN: CPT | Performed by: PHYSICAL MEDICINE & REHABILITATION

## 2021-11-23 RX ORDER — MELOXICAM 15 MG/1
7.5-15 TABLET ORAL DAILY PRN
Qty: 30 TABLET | Refills: 1 | Status: SHIPPED | OUTPATIENT
Start: 2021-11-23 | End: 2023-03-15

## 2021-11-23 ASSESSMENT — PAIN SCALES - GENERAL: PAINLEVEL: MILD PAIN (2)

## 2021-11-23 NOTE — LETTER
11/23/2021         RE: Steve Jin  [address]  [place] [address]        Dear Colleague,    Thank you for referring your patient, Steve Jin, to the [facility] [place]. Please see a copy of my visit note below.    Assessment/Plan:      Steve was seen today for establish care and back pain.    Diagnoses and all orders for this visit:    Lumbar spine pain  -     XR Lumbar Bending Only 2/3 Views; Future  -     Physical Therapy Referral; Future  -     meloxicam (MOBIC) 15 MG tablet; Take 0.5-1 tablets (7.5-15 mg) by mouth daily as needed for pain    Spondylolisthesis of lumbar region  -     XR Lumbar Bending Only 2/3 Views; Future  -     Physical Therapy Referral; Future  -     meloxicam (MOBIC) 15 MG tablet; Take 0.5-1 tablets (7.5-15 mg) by mouth daily as needed for pain    Foraminal stenosis of lumbar region  -     XR Lumbar Bending Only 2/3 Views; Future  -     Physical Therapy Referral; Future    Decreased range of right hip movement  -     XR Hip Right 2-3 Views; Future  -     Physical Therapy Referral; Future    Paresthesia of both hands  -     EMG; Future    Cervicalgia  -     XR Cervical Spine 2/3 Views; Future    Other orders  -     Orthopedic  Referral         Assessment: Pleasant 60 year old male with a history of osteopenia, thyroid disorder, asthma, enlarged prostate with:    1.  Chronic lumbar spine pain significantly worsened since May of last year lumbosacral junction and has a history of right dropfoot.  Has a grade 1 spondylolisthesis lytic L5 on S1 with severe foraminal stenosis bilaterally and MRI report.    2.  Decreased range of motion right hip consistent with osteoarthritis.  May contribute to low back pain.    3.  Bilateral hand paresthesias digits 1-3 question carpal tunnel syndrome.    4.  Chronic cervical spine pain.      Discussion:    1.    I discussed the diagnosis and treatment options.  We discussed the options of physical  therapy, further imaging obtaining images that have been done, medications, injections, surgery.  Want to start conservatively and not perform injections with possible or surgery.    2.  Plain films lumbar spine flexion-extension to evaluate.    3.  Right hip plain films as well as plain films of the cervical spine.    4.  EMG of the bilateral upper extremities to evaluate for carpal tunnel syndrome.    5.  Start physical therapy at [facility] for lumbar strengthening pelvic tilt and nerve glides.    6.  Trial meloxicam 7.5-15 mg daily as needed for pain in place of ibuprofen.    7.  Follow-up at his earliest convenience for EMG and follow-up after EMG.      It was our pleasure caring for your patient today, if there any questions or concerns please do not hesitate to contact us.      Subjective:   Patient ID: Steve Jin is a 60 year old male.    History of Present Illness: *Patient presents at the request of Dr. Holloway for an evaluation of low back pain with right lower extremity weakness thoracic pain also complains of neck pain and hand paresthesias. The most significant issue is the back pain.    He has had back pain intermittently for several years however worsened since last spring. Had right foot surgery for fractured fifth metatarsal. Following surgery he ended up having dropfoot and reports having an EMG done by Dr. Holland at [facility] reporting some L4 versus 5 radiculopathy although I do not have the report. He then spent the summer in [place] after doing some physical therapy at [facility] for the foot drop. While he was in [place] he had an MRI of the lumbar spine and is here today. The lumbar spine pain is a lumbosacral junction bilaterally left is greater than right the PSIS and low back radiates up to the thoracolumbar junction at times. Worse with sitting as well as sleeping asleep with a pillow under his knees. Better with sitting crosslegged on the floor and standing walking seems to be okay. He  does a lot of hiking and that seems to be fine. His pain is now constant however waxing and waning in intensity and describes it as an ache. No longer having any pain down his legs and he feels his strength is returned. In the past he has not had any injections here did have a steroid burst which helped his knees and his back and has seen a chiropractor. Reports his pain is 6/10 at worst 2/10 today and at best.    He also has some chronic neck pain issues through the cervical spine. He has numbness and tingling in digits 1-3 of the fingertips bilaterally.      Imaging: MRI report was reviewed from the patient's cell phone. Had a couple of images available on his cell phone as well but nothing in our system. He has degenerative disc disease lumbar spine with grade 1 spondylolisthesis lytic L5 on S1 with severe bilateral foraminal stenosis. No central stenosis reported.         Review of Systems: Complains of joint pain muscle pain muscle fatigue, ringing in the ears. Denies fever, weight loss, weight gain, headache, change in vision, chest pain, shortness of breath, abdominal pain, nausea, vomiting, bowel or bladder incontinence, skin issues., Balance issues. Remainder of 12 point review systems negative unless listed above.    Past Medical History:   Diagnosis Date     Actinic keratosis      Coronary artery disease      Hepatitis C     S/p pegasys and rebatol tx in early 2000s     Hepatitis C      Hyperlipidemia      Hyperlipidemia LDL goal <70      Hypothyroid      Hypothyroidism      Osteopenia      RBBB      Sensorineural hearing loss, unilateral     Right ear     Situational anxiety     Flying     Syncope        Family History   Problem Relation Age of Onset     Hypothyroidism Sister      Cancer Sister      Hypothyroidism Brother      Cerebrovascular Disease No family hx of      Diabetes No family hx of      Colon Cancer No family hx of      Heart Disease No family hx of      Hypertension No family hx of       Valvular heart disease Mother      Sudden Death Brother          Social History     Socioeconomic History     Marital status:      Spouse name: None     Number of children: None     Years of education: None     Highest education level: None   Occupational History     None   Tobacco Use     Smoking status: Former Smoker     Packs/day: 1.00     Years: 20.00     Pack years: 20.00     Types: Cigarettes     Smokeless tobacco: Never Used   Substance and Sexual Activity     Alcohol use: Not Currently     Drug use: Not Currently     Sexual activity: None   Other Topics Concern     None   Social History Narrative     None     Social Determinants of Health     Financial Resource Strain: Not on file   Food Insecurity: Not on file   Transportation Needs: Not on file   Physical Activity: Not on file   Stress: Not on file   Social Connections: Not on file   Intimate Partner Violence: Not on file   Housing Stability: Not on file     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    The following portions of the patient's history were reviewed and updated as appropriate: allergies, current medications, past family history, past medical history, past social history, past surgical history and problem list.    Oswestry (COURT) Questionnaire    OSWESTRY DISABILITY INDEX 11/23/2021   Count 10   Sum 9   Oswestry Score (%) 18   Some recent data might be hidden       Neck Disability Index:  No flowsheet data found.      WHO 5: 18            Objective:   Physical Exam:    /70 (BP Location: Right arm, Patient Position: Sitting)   Pulse 75   SpO2 99%   There is no height or weight on file to calculate BMI.      General:  Well-appearing male in no acute distress.  Pleasant, cooperative, and interactive throughout the examination and interview.  CV: No lower extremity edema on inspection or paltation.  Lymphatics: No cervical lymphadenopathy palpated. Eyes: sclera clear. Skin: No rashes or lesions  seen over the head/neck, hairline, arms, legs, trunk.  Respirations unlabored.  MSK: Gait is normal.  Able to heel-toe walk without difficulty.  Negative Romberg.  Spine: normal AP curves of the C, T, and L spine.  Full range of motion in the Lumbar spine in flexion. Mildly limited extension. Palpation: No significant tenderness lumbar paraspinals or gluteal tissues. Extremities: Full range of motion of the elbows, and wrists with no effusions or tenderness to palpation.   Decreased range of motion right hip internal rotation from seated. Full left hip internal and external rotation.  Full range of motion of the   knees, and ankles with no effusions or tenderness to palpation.   No hypermobility of the upper or lower extremities.  Neurologic exam: Mental status: Patient is alert and oriented with normal affect.  Attention, knowledge, memory, and language are intact.  Normal coordination throughout the examination.  Reflexes are 2+ and symmetric biceps, triceps, brachioradialis, patellar, and Achilles with down-going toes and Negative Eduard's.  Sensation is intact to light touch throughout the upper and lower extremities bilaterally.  Manual muscle testing reveals 5 out of 5 in the hip flexors, knee flexors/extensors, ankle plantar flexors, ankle  dorsiflexors, and EHL.  Upper extremities: Grossly normal strength . Normal muscle bulk and tone in the arms and legs.    Negative seated   straight leg raise bilaterally.          Again, thank you for allowing me to participate in the care of your patient.        Sincerely,        Seth Greenfield, DO

## 2021-11-23 NOTE — PATIENT INSTRUCTIONS
1. A physical therapy order was provided for you today.  You  will be contacted by physical therapy.  If nobody contacts you within 3 to 5 days, please contact the clinic at [phone].  It will be very important for you to do your physical therapy exercises on a regular basis to decrease your pain and prevent future pain flares.    2. An XRAY  was ordered for you today.  Please call Radiology at [phone].      3. EMG with Dr Greenfield    4. Meloxicam (which is an anti-inflammatory) medication is prescribed today. Take 1/2-1 tablet daily  as needed for pain. This medication should be taken with food and water to prevent any stomach upset. Do not take ibuprofen/Advil/Motrin/Aleve/naproxen while you take Meloxicam. Please call if you have any side effects.      5. Please sign a release to get MRI images from [facility] and EMG from [facility]

## 2021-11-23 NOTE — PROGRESS NOTES
Assessment/Plan:      Steve was seen today for establish care and back pain.    Diagnoses and all orders for this visit:    Lumbar spine pain  -     XR Lumbar Bending Only 2/3 Views; Future  -     Physical Therapy Referral; Future  -     meloxicam (MOBIC) 15 MG tablet; Take 0.5-1 tablets (7.5-15 mg) by mouth daily as needed for pain    Spondylolisthesis of lumbar region  -     XR Lumbar Bending Only 2/3 Views; Future  -     Physical Therapy Referral; Future  -     meloxicam (MOBIC) 15 MG tablet; Take 0.5-1 tablets (7.5-15 mg) by mouth daily as needed for pain    Foraminal stenosis of lumbar region  -     XR Lumbar Bending Only 2/3 Views; Future  -     Physical Therapy Referral; Future    Decreased range of right hip movement  -     XR Hip Right 2-3 Views; Future  -     Physical Therapy Referral; Future    Paresthesia of both hands  -     EMG; Future    Cervicalgia  -     XR Cervical Spine 2/3 Views; Future    Other orders  -     Orthopedic  Referral         Assessment: Pleasant 60 year old male with a history of osteopenia, thyroid disorder, asthma, enlarged prostate with:    1.  Chronic lumbar spine pain significantly worsened since May of last year lumbosacral junction and has a history of right dropfoot.  Has a grade 1 spondylolisthesis lytic L5 on S1 with severe foraminal stenosis bilaterally and MRI report.    2.  Decreased range of motion right hip consistent with osteoarthritis.  May contribute to low back pain.    3.  Bilateral hand paresthesias digits 1-3 question carpal tunnel syndrome.    4.  Chronic cervical spine pain.      Discussion:    1.    I discussed the diagnosis and treatment options.  We discussed the options of physical therapy, further imaging obtaining images that have been done, medications, injections, surgery.  Want to start conservatively and not perform injections with possible or surgery.    2.  Plain films lumbar spine flexion-extension to evaluate.    3.  Right hip plain  films as well as plain films of the cervical spine.    4.  EMG of the bilateral upper extremities to evaluate for carpal tunnel syndrome.    5.  Start physical therapy at [facility] for lumbar strengthening pelvic tilt and nerve glides.    6.  Trial meloxicam 7.5-15 mg daily as needed for pain in place of ibuprofen.    7.  Follow-up at his earliest convenience for EMG and follow-up after EMG.      It was our pleasure caring for your patient today, if there any questions or concerns please do not hesitate to contact us.      Subjective:   Patient ID: Steve Jin is a 60 year old male.    History of Present Illness: *Patient presents at the request of Dr. Holloway for an evaluation of low back pain with right lower extremity weakness thoracic pain also complains of neck pain and hand paresthesias. The most significant issue is the back pain.    He has had back pain intermittently for several years however worsened since last spring. Had right foot surgery for fractured fifth metatarsal. Following surgery he ended up having dropfoot and reports having an EMG done by Dr. Holland at [facility] reporting some L4 versus 5 radiculopathy although I do not have the report. He then spent the summer in [place] after doing some physical therapy at [facility] for the foot drop. While he was in [place] he had an MRI of the lumbar spine and is here today. The lumbar spine pain is a lumbosacral junction bilaterally left is greater than right the PSIS and low back radiates up to the thoracolumbar junction at times. Worse with sitting as well as sleeping asleep with a pillow under his knees. Better with sitting crosslegged on the floor and standing walking seems to be okay. He does a lot of hiking and that seems to be fine. His pain is now constant however waxing and waning in intensity and describes it as an ache. No longer having any pain down his legs and he feels his strength is returned. In the past he has not had any injections here  did have a steroid burst which helped his knees and his back and has seen a chiropractor. Reports his pain is 6/10 at worst 2/10 today and at best.    He also has some chronic neck pain issues through the cervical spine. He has numbness and tingling in digits 1-3 of the fingertips bilaterally.      Imaging: MRI report was reviewed from the patient's cell phone. Had a couple of images available on his cell phone as well but nothing in our system. He has degenerative disc disease lumbar spine with grade 1 spondylolisthesis lytic L5 on S1 with severe bilateral foraminal stenosis. No central stenosis reported.         Review of Systems: Complains of joint pain muscle pain muscle fatigue, ringing in the ears. Denies fever, weight loss, weight gain, headache, change in vision, chest pain, shortness of breath, abdominal pain, nausea, vomiting, bowel or bladder incontinence, skin issues., Balance issues. Remainder of 12 point review systems negative unless listed above.    Past Medical History:   Diagnosis Date     Actinic keratosis      Coronary artery disease      Hepatitis C     S/p pegasys and rebatol tx in early 2000s     Hepatitis C      Hyperlipidemia      Hyperlipidemia LDL goal <70      Hypothyroid      Hypothyroidism      Osteopenia      RBBB      Sensorineural hearing loss, unilateral     Right ear     Situational anxiety     Flying     Syncope        Family History   Problem Relation Age of Onset     Hypothyroidism Sister      Cancer Sister      Hypothyroidism Brother      Cerebrovascular Disease No family hx of      Diabetes No family hx of      Colon Cancer No family hx of      Heart Disease No family hx of      Hypertension No family hx of      Valvular heart disease Mother      Sudden Death Brother          Social History     Socioeconomic History     Marital status:      Spouse name: None     Number of children: None     Years of education: None     Highest education level: None   Occupational  History     None   Tobacco Use     Smoking status: Former Smoker     Packs/day: 1.00     Years: 20.00     Pack years: 20.00     Types: Cigarettes     Smokeless tobacco: Never Used   Substance and Sexual Activity     Alcohol use: Not Currently     Drug use: Not Currently     Sexual activity: None   Other Topics Concern     None   Social History Narrative     None     Social Determinants of Health     Financial Resource Strain: Not on file   Food Insecurity: Not on file   Transportation Needs: Not on file   Physical Activity: Not on file   Stress: Not on file   Social Connections: Not on file   Intimate Partner Violence: Not on file   Housing Stability: Not on file     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    The following portions of the patient's history were reviewed and updated as appropriate: allergies, current medications, past family history, past medical history, past social history, past surgical history and problem list.    Oswestry (COURT) Questionnaire    OSWESTRY DISABILITY INDEX 11/23/2021   Count 10   Sum 9   Oswestry Score (%) 18   Some recent data might be hidden       Neck Disability Index:  No flowsheet data found.      WHO 5: 18            Objective:   Physical Exam:    /70 (BP Location: Right arm, Patient Position: Sitting)   Pulse 75   SpO2 99%   There is no height or weight on file to calculate BMI.      General:  Well-appearing male in no acute distress.  Pleasant, cooperative, and interactive throughout the examination and interview.  CV: No lower extremity edema on inspection or paltation.  Lymphatics: No cervical lymphadenopathy palpated. Eyes: sclera clear. Skin: No rashes or lesions seen over the head/neck, hairline, arms, legs, trunk.  Respirations unlabored.  MSK: Gait is normal.  Able to heel-toe walk without difficulty.  Negative Romberg.  Spine: normal AP curves of the C, T, and L spine.  Full range of motion in the Lumbar spine in  flexion. Mildly limited extension. Palpation: No significant tenderness lumbar paraspinals or gluteal tissues. Extremities: Full range of motion of the elbows, and wrists with no effusions or tenderness to palpation.   Decreased range of motion right hip internal rotation from seated. Full left hip internal and external rotation.  Full range of motion of the   knees, and ankles with no effusions or tenderness to palpation.   No hypermobility of the upper or lower extremities.  Neurologic exam: Mental status: Patient is alert and oriented with normal affect.  Attention, knowledge, memory, and language are intact.  Normal coordination throughout the examination.  Reflexes are 2+ and symmetric biceps, triceps, brachioradialis, patellar, and Achilles with down-going toes and Negative Eduard's.  Sensation is intact to light touch throughout the upper and lower extremities bilaterally.  Manual muscle testing reveals 5 out of 5 in the hip flexors, knee flexors/extensors, ankle plantar flexors, ankle  dorsiflexors, and EHL.  Upper extremities: Grossly normal strength . Normal muscle bulk and tone in the arms and legs.    Negative seated   straight leg raise bilaterally.

## 2021-11-26 ENCOUNTER — HOSPITAL ENCOUNTER (OUTPATIENT)
Dept: ULTRASOUND IMAGING | Facility: HOSPITAL | Age: 61
Discharge: HOME | End: 2021-11-26
Attending: STUDENT IN AN ORGANIZED HEALTH CARE EDUCATION/TRAINING PROGRAM | Admitting: STUDENT IN AN ORGANIZED HEALTH CARE EDUCATION/TRAINING PROGRAM
Payer: COMMERCIAL

## 2021-11-26 DIAGNOSIS — N28.1 BILATERAL RENAL CYSTS: ICD-10-CM

## 2021-11-26 PROCEDURE — 76770 US EXAM ABDO BACK WALL COMP: CPT

## 2021-12-03 ENCOUNTER — HOSPITAL ENCOUNTER (OUTPATIENT)
Dept: RADIOLOGY | Facility: HOSPITAL | Age: 61
End: 2021-12-03
Attending: PHYSICAL MEDICINE & REHABILITATION
Payer: COMMERCIAL

## 2021-12-03 DIAGNOSIS — M48.061 FORAMINAL STENOSIS OF LUMBAR REGION: ICD-10-CM

## 2021-12-03 DIAGNOSIS — M54.2 CERVICALGIA: ICD-10-CM

## 2021-12-03 DIAGNOSIS — M54.50 LUMBAR SPINE PAIN: ICD-10-CM

## 2021-12-03 DIAGNOSIS — M25.651 DECREASED RANGE OF RIGHT HIP MOVEMENT: ICD-10-CM

## 2021-12-03 DIAGNOSIS — M43.16 SPONDYLOLISTHESIS OF LUMBAR REGION: ICD-10-CM

## 2021-12-03 PROCEDURE — 72040 X-RAY EXAM NECK SPINE 2-3 VW: CPT

## 2021-12-03 PROCEDURE — 72120 X-RAY BEND ONLY L-S SPINE: CPT

## 2021-12-03 PROCEDURE — 73502 X-RAY EXAM HIP UNI 2-3 VIEWS: CPT

## 2021-12-06 ENCOUNTER — LAB (OUTPATIENT)
Dept: LAB | Facility: CLINIC | Age: 61
End: 2021-12-06
Payer: COMMERCIAL

## 2021-12-06 DIAGNOSIS — Z20.822 ENCOUNTER FOR LABORATORY TESTING FOR COVID-19 VIRUS: Primary | ICD-10-CM

## 2021-12-06 DIAGNOSIS — Z20.822 ENCOUNTER FOR LABORATORY TESTING FOR COVID-19 VIRUS: ICD-10-CM

## 2021-12-06 PROCEDURE — U0003 INFECTIOUS AGENT DETECTION BY NUCLEIC ACID (DNA OR RNA); SEVERE ACUTE RESPIRATORY SYNDROME CORONAVIRUS 2 (SARS-COV-2) (CORONAVIRUS DISEASE [COVID-19]), AMPLIFIED PROBE TECHNIQUE, MAKING USE OF HIGH THROUGHPUT TECHNOLOGIES AS DESCRIBED BY CMS-2020-01-R: HCPCS | Mod: 90

## 2021-12-06 PROCEDURE — 99000 SPECIMEN HANDLING OFFICE-LAB: CPT

## 2021-12-06 PROCEDURE — U0005 INFEC AGEN DETEC AMPLI PROBE: HCPCS | Mod: 90

## 2021-12-07 ENCOUNTER — OFFICE VISIT (OUTPATIENT)
Dept: PHYSICAL MEDICINE AND REHAB | Facility: CLINIC | Age: 61
End: 2021-12-07
Attending: PHYSICAL MEDICINE & REHABILITATION
Payer: COMMERCIAL

## 2021-12-07 VITALS — DIASTOLIC BLOOD PRESSURE: 60 MMHG | SYSTOLIC BLOOD PRESSURE: 106 MMHG | HEART RATE: 56 BPM

## 2021-12-07 DIAGNOSIS — M48.061 FORAMINAL STENOSIS OF LUMBAR REGION: ICD-10-CM

## 2021-12-07 DIAGNOSIS — R20.2 PARESTHESIA OF BOTH HANDS: Primary | ICD-10-CM

## 2021-12-07 DIAGNOSIS — M25.651 DECREASED RANGE OF RIGHT HIP MOVEMENT: ICD-10-CM

## 2021-12-07 DIAGNOSIS — M54.50 LUMBAR SPINE PAIN: ICD-10-CM

## 2021-12-07 DIAGNOSIS — M50.30 DDD (DEGENERATIVE DISC DISEASE), CERVICAL: ICD-10-CM

## 2021-12-07 DIAGNOSIS — M43.16 SPONDYLOLISTHESIS OF LUMBAR REGION: ICD-10-CM

## 2021-12-07 LAB
SARS-COV-2 RNA RESP QL NAA+PROBE: NORMAL
SARS-COV-2 RNA RESP QL NAA+PROBE: NOT DETECTED

## 2021-12-07 PROCEDURE — 95886 MUSC TEST DONE W/N TEST COMP: CPT | Mod: RT | Performed by: PHYSICAL MEDICINE & REHABILITATION

## 2021-12-07 PROCEDURE — 99213 OFFICE O/P EST LOW 20 MIN: CPT | Mod: 25 | Performed by: PHYSICAL MEDICINE & REHABILITATION

## 2021-12-07 PROCEDURE — 95912 NRV CNDJ TEST 11-12 STUDIES: CPT | Performed by: PHYSICAL MEDICINE & REHABILITATION

## 2021-12-07 ASSESSMENT — PAIN SCALES - GENERAL: PAINLEVEL: NO PAIN (0)

## 2021-12-07 NOTE — PROGRESS NOTES
Assessment/Plan:      Steve was seen today for emg.    Diagnoses and all orders for this visit:    Paresthesia of both hands  -     EMG  -     Physical Therapy Referral; Future  -     [place] NCS MOTOR W OR W/O F-WAVE, 11 OR 12  -     [place] NEEDLE EMG EA EXTREMTY W/PARASPINAL AREA COMPLETE    DDD (degenerative disc disease), cervical  -     Physical Therapy Referral; Future    Lumbar spine pain  -     Physical Therapy Referral; Future    Spondylolisthesis of lumbar region  -     Physical Therapy Referral; Future    Foraminal stenosis of lumbar region  -     Physical Therapy Referral; Future    Decreased range of right hip movement  -     Physical Therapy Referral; Future         Assessment: Pleasant 60 year old male with a history of osteopenia, thyroid disorder, asthma, enlarged prostate with:     1. Bilateral hand paresthesias digits 1-3  .  Unknown etiology.  He does have changes on EMG today in the right upper extremity which can be consistent with a chronic and inactive right C7 radiculopathy and he does have C5-6 degenerative disc disease and milder at C6-7 on plain films.  Has not started physical therapy..     2.  Chronic cervical spine pain.     3.  Chronic lumbar spine pain significantly worsened since May of last year lumbosacral junction and has a history of right dropfoot.  Has a grade 1 spondylolisthesis lytic L5 on S1 with severe foraminal stenosis bilaterally and MRI report.     3.  Decreased range of motion right hip consistent with osteoarthritis.  May contribute to low back pain.       Discussion:    1.  We reviewed plain films of the cervical spine and hips today.  We also reviewed flexion-extension x-ray of the lumbar spine and EMG.  All of his questions were answered.  Has not yet started physical therapy.  We discussed the benefits of formal therapy and potentially adding therapy for the cervical spine.  We discussed options of advanced imaging for the neck as well.      2.  physical therapy has been  placed for cervical spine strengthening stabilization as well as nerve glides.    3.  Continue plan of physical therapy for the lumbar spine.    4.  Chiropractic care as this is helpful for his lumbar spine.  Would avoid HVLA techniques of the cervical spine for now.    5.  He would like to see how conservative management does for him over the next few months and will follow up in 3 months time.    25 minutes were spent on the date of the encounter performing chart review, patient visit and documentation in addition to any procedure.      It was our pleasure caring for your patient today, if there any questions or concerns please do not hesitate to contact us.      Subjective:   Patient ID: Steve Jin is a 60 year old male.    History of Present Illness: Patient presents today for follow-up of low back pain after lumbar x-rays along with cervical spine and bilateral hand paresthesias to digits 1-3.  Symptoms have not changed since last visit.  Continues have cervical spine pain has had plain films done which were reviewed with the patient today.  Pain is 8/10 at worst 0/10 today.  EMG was done today and reviewed as well.    He also has low back pain with some right hip pain/decreased range of motion.  Pain is worse with sitting or lifting along with sleeping better with stretching and chiropractics along with ice.  Has not started formal physical therapy as of yet but has done some stretching through his chiropractor.    EMG done today: He does have some chronic changes to the motor units of the right tricep which under the correct clinical condition can be consistent with a right C7 radiculopathy but nondiagnostic.  No electrodiagnostic evidence of median neuropathy at the wrist.    Imaging: Plain films of the cervical spine personally reviewed along with lumbar spine and hips.  Images and report personally reviewed with the patient.  Retrolisthesis 2 mm C5 on 6 with advanced disc height loss at that level some  mild degenerative changes C3-4 normal C1-2 junction.  C6-7 on my review also shows some mild degenerative changes.  Lumbar plain films show spondylolisthesis grade 1 L5 on S1 with no instability from flexion to extension.  Advanced disc height loss L5-S1.    Right hip shows normal alignment mild to moderate hip osteoarthritis some heterotopic ossification noted.    Review of Systems:  pertinent positives: Numbness and tingling in the hands..  Pertinent negatives: No   weakness.  No bowel or bladder incontinence.  No urinary retention.  No fevers, unintentional weight loss, balance changes, headaches, frequent falling, difficulty swallowing, or coordination difficulties.  All others reviewed are negative.         Past Medical History:   Diagnosis Date     Actinic keratosis      Coronary artery disease      Hepatitis C     S/p pegasys and rebatol tx in early 2000s     Hepatitis C      Hyperlipidemia      Hyperlipidemia LDL goal <70      Hypothyroid      Hypothyroidism      Osteopenia      RBBB      Sensorineural hearing loss, unilateral     Right ear     Situational anxiety     Flying     Syncope        The following portions of the patient's history were reviewed and updated as appropriate: allergies, current medications, past family history, past medical history, past social history, past surgical history and problem list.           Objective:   Physical Exam:    /60 (BP Location: Right arm, Patient Position: Sitting, Cuff Size: Adult Regular)   Pulse 56   There is no height or weight on file to calculate BMI.      General: Alert and oriented with normal affect. Attention, knowledge, memory, and language are intact. No acute distress.   Eyes: Sclerae are clear.  Respirations: Unlabored.   Skin: No rashes seen.  Gait:  Nonantalgic    Sensation is intact to light touch throughout the upper  extremities.  Reflexes are 2+ and symmetric in the biceps triceps and brachioradialis with negative Hoffmans.      Manual  muscle testing reveals:  Right /Left out of 5  5/5 shoulder abductors  5/5 elbow flexors  5/5 elbow extensors  5/5 wrist extensors  5/5 interosseus  5/5 finger flexors

## 2021-12-07 NOTE — LETTER
12/7/2021         RE: Steve Jin  [address]  [place] [facility] [address]        Dear Colleague,    Thank you for referring your patient, Steve Jin, to the [facility] [place]. Please see a copy of my visit note below.    Assessment/Plan:      Steve was seen today for emg.    Diagnoses and all orders for this visit:    Paresthesia of both hands  -     EMG  -     Physical Therapy Referral; Future  -     MD NCS MOTOR W OR W/O F-WAVE, 11 OR 12  -     MD NEEDLE EMG EA EXTREMTY W/PARASPINAL AREA COMPLETE    DDD (degenerative disc disease), cervical  -     Physical Therapy Referral; Future    Lumbar spine pain  -     Physical Therapy Referral; Future    Spondylolisthesis of lumbar region  -     Physical Therapy Referral; Future    Foraminal stenosis of lumbar region  -     Physical Therapy Referral; Future    Decreased range of right hip movement  -     Physical Therapy Referral; Future         Assessment: Pleasant 60 year old male with a history of osteopenia, thyroid disorder, asthma, enlarged prostate with:     1. Bilateral hand paresthesias digits 1-3  .  Unknown etiology.  He does have changes on EMG today in the right upper extremity which can be consistent with a chronic and inactive right C7 radiculopathy and he does have C5-6 degenerative disc disease and milder at C6-7 on plain films.  Has not started physical therapy..     2.  Chronic cervical spine pain.     3.  Chronic lumbar spine pain significantly worsened since May of last year lumbosacral junction and has a history of right dropfoot.  Has a grade 1 spondylolisthesis lytic L5 on S1 with severe foraminal stenosis bilaterally and MRI report.     3.  Decreased range of motion right hip consistent with osteoarthritis.  May contribute to low back pain.       Discussion:    1.  We reviewed plain films of the cervical spine and hips today.  We also reviewed flexion-extension x-ray of the lumbar spine and EMG.  All of  his questions were answered.  Has not yet started physical therapy.  We discussed the benefits of formal therapy and potentially adding therapy for the cervical spine.  We discussed options of advanced imaging for the neck as well.      2.  physical therapy has been placed for cervical spine strengthening stabilization as well as nerve glides.    3.  Continue plan of physical therapy for the lumbar spine.    4.  Chiropractic care as this is helpful for his lumbar spine.  Would avoid HVLA techniques of the cervical spine for now.    5.  He would like to see how conservative management does for him over the next few months and will follow up in 3 months time.    25 minutes were spent on the date of the encounter performing chart review, patient visit and documentation in addition to any procedure.      It was our pleasure caring for your patient today, if there any questions or concerns please do not hesitate to contact us.      Subjective:   Patient ID: Steve Jin is a 60 year old male.    History of Present Illness: Patient presents today for follow-up of low back pain after lumbar x-rays along with cervical spine and bilateral hand paresthesias to digits 1-3.  Symptoms have not changed since last visit.  Continues have cervical spine pain has had plain films done which were reviewed with the patient today.  Pain is 8/10 at worst 0/10 today.  EMG was done today and reviewed as well.    He also has low back pain with some right hip pain/decreased range of motion.  Pain is worse with sitting or lifting along with sleeping better with stretching and chiropractics along with ice.  Has not started formal physical therapy as of yet but has done some stretching through his chiropractor.    EMG done today: He does have some chronic changes to the motor units of the right tricep which under the correct clinical condition can be consistent with a right C7 radiculopathy but nondiagnostic.  No electrodiagnostic evidence of  median neuropathy at the wrist.    Imaging: Plain films of the cervical spine personally reviewed along with lumbar spine and hips.  Images and report personally reviewed with the patient.  Retrolisthesis 2 mm C5 on 6 with advanced disc height loss at that level some mild degenerative changes C3-4 normal C1-2 junction.  C6-7 on my review also shows some mild degenerative changes.  Lumbar plain films show spondylolisthesis grade 1 L5 on S1 with no instability from flexion to extension.  Advanced disc height loss L5-S1.    Right hip shows normal alignment mild to moderate hip osteoarthritis some heterotopic ossification noted.    Review of Systems:  pertinent positives: Numbness and tingling in the hands..  Pertinent negatives: No   weakness.  No bowel or bladder incontinence.  No urinary retention.  No fevers, unintentional weight loss, balance changes, headaches, frequent falling, difficulty swallowing, or coordination difficulties.  All others reviewed are negative.         Past Medical History:   Diagnosis Date     Actinic keratosis      Coronary artery disease      Hepatitis C     S/p pegasys and rebatol tx in early 2000s     Hepatitis C      Hyperlipidemia      Hyperlipidemia LDL goal <70      Hypothyroid      Hypothyroidism      Osteopenia      RBBB      Sensorineural hearing loss, unilateral     Right ear     Situational anxiety     Flying     Syncope        The following portions of the patient's history were reviewed and updated as appropriate: allergies, current medications, past family history, past medical history, past social history, past surgical history and problem list.           Objective:   Physical Exam:    /60 (BP Location: Right arm, Patient Position: Sitting, Cuff Size: Adult Regular)   Pulse 56   There is no height or weight on file to calculate BMI.      General: Alert and oriented with normal affect. Attention, knowledge, memory, and language are intact. No acute distress.   Eyes:  Sclerae are clear.  Respirations: Unlabored.   Skin: No rashes seen.  Gait:  Nonantalgic    Sensation is intact to light touch throughout the upper  extremities.  Reflexes are 2+ and symmetric in the biceps triceps and brachioradialis with negative Hoffmans.      Manual muscle testing reveals:  Right /Left out of 5  5/5 shoulder abductors  5/5 elbow flexors  5/5 elbow extensors  5/5 wrist extensors  5/5 interosseus  5/5 finger flexors       Patient presents at the request of Dr. Seth Greenfield for bilateral upper extremity EMG.  Has cervical spine pain with bilateral hand numbness and tingling digits 1-3.  He is right-handed.  Right equal to left in severity.  On exam he has normal sensation to light touch of the upper extremities, normal strength at the major muscle groups of the upper extremities, symmetric reflexes bilateral upper extremities.    EMG/NCS  results: Please see scanned full report    Comment NCS: Normal study  1.  Normal nerve conduction studies bilateral upper extremity.  This includes normal right median, ulnar, and radial SNAPs, bilateral median and ulnar transcarpal studies, and bilateral median and ulnar CMAPs.    Comment EMG: Abnormal study:  1.  Mild prolonged motor unit potential duration right tricep comparison to left.  Remainder right upper extremity needle EMG normal.  2.  Normal left upper extremity EMG.  Needle    Interpretation: Abnormal: There is electrodiagnostic evidence of: Study    1.  There is prolonged motor unit potential duration of the left tricep.  In isolation, this is nonspecific and nondiagnostic.  Under the  correct clinical condition, this can be observed in a chronic and inactive left C7 radiculopathy.     2.  There is no electrodiagnostic evidence of brachial plexopathy or focal neuropathy in the right upper extremity.     3. There is no electrodiagnostic evidence of cervical radiculopathy, brachial plexopathy, or focal neuropathy in the left upper extremity.   Specifically, there is no electrodiagnostic evidence of median neuropathy at the wrist in the bilateral upper extremities.    The testing was completed in its entirety by the physician.       It was our pleasure caring for your patient today, if there any questions or concerns please do not hesitate to contact us.        Again, thank you for allowing me to participate in the care of your patient.        Sincerely,        Seth Greenfield, DO

## 2021-12-08 NOTE — RESULT ENCOUNTER NOTE
Hi Mr. Jin,    Your COVID-19 test was negative. Please let me know if you have questions.    Sergio Salmeron MD

## 2021-12-13 ENCOUNTER — MEDICAL CORRESPONDENCE (OUTPATIENT)
Dept: HEALTH INFORMATION MANAGEMENT | Facility: CLINIC | Age: 61
End: 2021-12-13

## 2021-12-13 ENCOUNTER — OFFICE VISIT (OUTPATIENT)
Dept: PODIATRY | Facility: CLINIC | Age: 61
End: 2021-12-13
Attending: STUDENT IN AN ORGANIZED HEALTH CARE EDUCATION/TRAINING PROGRAM
Payer: COMMERCIAL

## 2021-12-13 ENCOUNTER — ANCILLARY PROCEDURE (OUTPATIENT)
Dept: BONE DENSITY | Facility: CLINIC | Age: 61
End: 2021-12-13
Attending: STUDENT IN AN ORGANIZED HEALTH CARE EDUCATION/TRAINING PROGRAM
Payer: COMMERCIAL

## 2021-12-13 VITALS — BODY MASS INDEX: 22.96 KG/M2 | OXYGEN SATURATION: 98 % | HEART RATE: 64 BPM | HEIGHT: 69 IN | WEIGHT: 155 LBS

## 2021-12-13 DIAGNOSIS — Q66.70 PES CAVUS: Primary | ICD-10-CM

## 2021-12-13 DIAGNOSIS — M85.80 OSTEOPENIA, UNSPECIFIED LOCATION: ICD-10-CM

## 2021-12-13 PROCEDURE — 77080 DXA BONE DENSITY AXIAL: CPT | Mod: TC | Performed by: RADIOLOGY

## 2021-12-13 PROCEDURE — 99213 OFFICE O/P EST LOW 20 MIN: CPT | Performed by: PODIATRIST

## 2021-12-13 ASSESSMENT — PAIN SCALES - GENERAL: PAINLEVEL: MILD PAIN (2)

## 2021-12-13 ASSESSMENT — MIFFLIN-ST. JEOR: SCORE: 1488.46

## 2021-12-13 NOTE — PATIENT INSTRUCTIONS
What are Prescription Custom Orthotics?  Custom orthotics are specially-made devices designed to support and comfort your feet. Prescription orthotics are crafted for you and no one else. They match the contours of your feet precisely and are designed for the way you move. Orthotics are only manufactured after a podiatrist has conducted a complete evaluation of your feet, ankles, and legs, so the orthotic can accommodate your unique foot structure and pathology.  Prescription orthotics are divided into two categories:    Functional orthotics are designed to control abnormal motion. They may be used to treat foot pain caused by abnormal motion; they can also be used to treat injuries such as shin splints or tendinitis. Functional orthotics are usually crafted of a semi-rigid material such as plastic or graphite.    Accommodative orthotics are softer and meant to provide additional cushioning and support. They can be used to treat diabetic foot ulcers, painful calluses on the bottom of the foot, and other uncomfortable conditions.  Podiatrists use orthotics to treat foot problems such as plantar fasciitis, bursitis, tendinitis, diabetic foot ulcers, and foot, ankle, and heel pain. Clinical research studies have shown that podiatrist-prescribed foot orthotics decrease foot pain and improve function.  Orthotics typically cost more than shoe inserts purchased in a retail store, but the additional cost is usually well worth it. Unlike shoe inserts, orthotics are molded to fit each individual foot, so you can be sure that your orthotics fit and do what they're supposed to do. Prescription orthotics are also made of top-notch materials and last many years when cared for properly. Insurance often helps pay for prescription orthotics.  What are Shoe Inserts?   You've seen them at the grocery store and at the mall. You've probably even seen them on TV and online. Shoe inserts are any kind of non-prescription foot support  designed to be worn inside a shoe. Pre-packaged, mass produced, arch supports are shoe inserts. So are the  custom-made  insoles and foot supports that you can order online or at retail stores. Unless the device has been prescribed by a doctor and crafted for your specific foot, it's a shoe insert, not a custom orthotic device--despite what the ads might say.  Shoe inserts can be very helpful for a variety of foot ailments, including flat arches and foot and leg pain. They can cushion your feet, provide comfort, and support your arches, but they can't correct biomechanical foot problems or cure long-standing foot issues.  The most common types of shoe inserts are:    Arch supports: Some people have high arches. Others have low arches or flat feet. Arch supports generally have a  bumped-up  appearance and are designed to support the foot's natural arch.     Insoles: Insoles slip into your shoe to provide extra cushioning and support. Insoles are often made of gel, foam, or plastic.     Heel liners: Heel liners, sometimes called heel pads or heel cups, provide extra cushioning in the heel region. They may be especially useful for patients who have foot pain caused by age-related thinning of the heels' natural fat pads.     Foot cushions: Do your shoes rub against your heel or your toes? Foot cushions come in many different shapes and sizes and can be used as a barrier between you and your shoe.  Choosing an Over-the-Counter Shoe Insert  Selecting a shoe insert from the wide variety of devices on the market can be overwhelming. Here are some podiatrist-tested tips to help you find the insert that best meets your needs:    Consider your health. Do you have diabetes? Problems with circulation? An over-the-counter insert may not be your best bet. Diabetes and poor circulation increase your risk of foot ulcers and infections, so schedule an appointment with a podiatrist. He or she can help you select a solution that won't  cause additional health problems.     Think about the purpose. Are you planning to run a marathon, or do you just need a little arch support in your work shoes? Look for a product that fits your planned level of activity.     Bring your shoes. For the insert to be effective, it has to fit into your shoes. So bring your sneakers, dress shoes, or work boots--whatever you plan to wear with your insert. Look for an insert that will fit the contours of your shoe.     Try them on. If all possible, slip the insert into your shoe and try it out. Walk around a little. How does it feel? Don't assume that feelings of pressure will go away with continued wear. (If you can't try the inserts at the store, ask about the store's return policy and hold on to your receipt.)    Please call one of the [place] locations below to schedule an appointment. If you received a prescription please bring it with you to your appointment. Some locations are limited to what they carry.    Office Locations    [facility]  [address] [place] [address]  Home Medical Equipment, [address]   Phone: [phone]   Orthotics and Prosthetics, [address]   Phone: [phone]    [facility]  Home Medical Equipment  [address], [place] [address]   Phone: [phone]    Orthotics and Prost[facility] ([facility])    [address], [address], [place] [address]  Phone: [phone]    [facility]  [address]   [place] [address]   Phone: [phone]    [facility]  [address]  [place] [address]  Phone: [phone]    [facility]   [address]  [place] [address]  Phone: [phone]    [facility]  [address]  [place] [address]  Phone: [phone]    [facility]  [address]  [place] [address]  Phone: [phone]    [address] [place] [address]   Phone: [phone]

## 2021-12-13 NOTE — PROGRESS NOTES
FOOT AND ANKLE SURGERY/PODIATRY CONSULT NOTE         ASSESSMENT:   Pes Cavus      TREATMENT:  -The patient does have increased medial arch on both feet. We discussed benefits of arch support with custom orthotics.     -I have referred him to [facility] for custom orthotics.     -Patient's questions invited and answered. He was encouraged to call my office with any further questions or concerns.     Abdiaziz Song DPM  [facility] Podiatry/Foot & Ankle Surgery  [phone]      HPI: I was asked to see Steve Jin today for concerns related to high arches and orthotics. The patient states he is recovering from an ORIF of the right 5th metatarsal with Dr. Giordano. He is walking without pain and was told to consider orthotics for additional arch supports.       Past Medical History:   Diagnosis Date     Actinic keratosis      Coronary artery disease      Hepatitis C     S/p pegasys and rebatol tx in early 2000s     Hepatitis C      Hyperlipidemia      Hyperlipidemia LDL goal <70      Hypothyroid      Hypothyroidism      Osteopenia      RBBB      Sensorineural hearing loss, unilateral     Right ear     Situational anxiety     Flying     Syncope          Social History     Socioeconomic History     Marital status:      Spouse name: Not on file     Number of children: Not on file     Years of education: Not on file     Highest education level: Not on file   Occupational History     Not on file   Tobacco Use     Smoking status: Former Smoker     Packs/day: 1.00     Years: 20.00     Pack years: 20.00     Types: Cigarettes     Smokeless tobacco: Never Used   Substance and Sexual Activity     Alcohol use: Not Currently     Drug use: Not Currently     Sexual activity: Not on file   Other Topics Concern     Not on file   Social History Narrative     Not on file     Social Determinants of Health     Financial Resource Strain: Not on file   Food Insecurity: Not on file   Transportation Needs: Not on file  "  Physical Activity: Not on file   Stress: Not on file   Social Connections: Not on file   Intimate Partner Violence: Not on file   Housing Stability: Not on file            Allergies   Allergen Reactions     Sulfa Drugs Rash     Sulfanilamide Rash     Doctor believes he was allergic to topical ointment as a teenager ?sulfa containing        Hydrocodone-Acetaminophen Other (See Comments)     No Clinical Screening - See Comments      PN: LW CM1: CONTRAST- nka Reaction :         MEDICATIONS:   Current Outpatient Medications   Medication     aspirin (ASA) 81 MG chewable tablet     atorvastatin (LIPITOR) 40 MG tablet     levothyroxine (SYNTHROID/LEVOTHROID) 112 MCG tablet     gabapentin (NEURONTIN) 100 MG capsule     ibuprofen (ADVIL/MOTRIN) 200 MG tablet     meloxicam (MOBIC) 15 MG tablet     No current facility-administered medications for this visit.        Family History   Problem Relation Age of Onset     Hypothyroidism Sister      Cancer Sister      Hypothyroidism Brother      Cerebrovascular Disease No family hx of      Diabetes No family hx of      Colon Cancer No family hx of      Heart Disease No family hx of      Hypertension No family hx of      Valvular heart disease Mother      Sudden Death Brother           Review of Systems - 10 point Review of Systems is negative except for arch support which is noted in HPI.    OBJECTIVE:  Appearance: alert, well appearing, and in no distress.    VITAL SIGNS: Pulse 64   Ht 1.753 m (5' 9\")   Wt 70.3 kg (155 lb)   SpO2 98%   BMI 22.89 kg/m        General appearance: Patient is alert and fully cooperative with history & exam.  No sign of distress is noted during the visit.     Psychiatric: Affect is pleasant & appropriate.  Patient appears motivated to improve health.     Respiratory: Breathing is regular & unlabored while sitting.     HEENT: Hearing is intact to spoken word.  Speech is clear.  No gross evidence of visual impairment that would impact " ambulation.      Vascular: Dorsalis pedis and posterior tibial pulses are palpable. There is pedal hair growth bilateral.  CFT < 3 sec from anterior tibial surface to distal digits bilateral. There is no appreciable edema noted.  Dermatologic: Turgor and texture are within normal limits. No coloration or temperature changes. No primary or secondary lesions noted.  Neurologic: All epicritic and proprioceptive sensations are grossly intact bilateral.  Musculoskeletal: Increased medial arch bilateral. No pain along 5th metatarsal right foot.

## 2021-12-27 ENCOUNTER — TELEPHONE (OUTPATIENT)
Dept: FAMILY MEDICINE | Facility: CLINIC | Age: 61
End: 2021-12-27
Payer: COMMERCIAL

## 2021-12-27 NOTE — TELEPHONE ENCOUNTER
Medication Refill:     gabapentin (NEURONTIN) 100 MG capsule      Pt reports not taking.  Please advise.        Ulises Tan, EMT  2:44 PM  12/27/2021

## 2021-12-28 ENCOUNTER — MYC MEDICAL ADVICE (OUTPATIENT)
Dept: FAMILY MEDICINE | Facility: CLINIC | Age: 61
End: 2021-12-28
Payer: COMMERCIAL

## 2021-12-30 ENCOUNTER — OFFICE VISIT (OUTPATIENT)
Dept: FAMILY MEDICINE | Facility: CLINIC | Age: 61
End: 2021-12-30
Payer: COMMERCIAL

## 2021-12-30 VITALS
HEART RATE: 59 BPM | DIASTOLIC BLOOD PRESSURE: 77 MMHG | SYSTOLIC BLOOD PRESSURE: 120 MMHG | TEMPERATURE: 97.4 F | WEIGHT: 154 LBS | BODY MASS INDEX: 22.74 KG/M2

## 2021-12-30 DIAGNOSIS — S83.231A COMPLEX TEAR OF MEDIAL MENISCUS OF RIGHT KNEE, UNSPECIFIED WHETHER OLD OR CURRENT TEAR, INITIAL ENCOUNTER: ICD-10-CM

## 2021-12-30 DIAGNOSIS — G89.29 CHRONIC PAIN OF RIGHT KNEE: Primary | ICD-10-CM

## 2021-12-30 DIAGNOSIS — M25.561 CHRONIC PAIN OF RIGHT KNEE: Primary | ICD-10-CM

## 2021-12-30 PROCEDURE — 99214 OFFICE O/P EST MOD 30 MIN: CPT | Performed by: FAMILY MEDICINE

## 2021-12-30 NOTE — PROGRESS NOTES
Assessment/plan  1. Chronic pain of right knee  - XR Knee Right 3 Views; Future  - MR Knee Right w/o Contrast; Future  EHR reviewed.   Past medical history, problem list, past surgical history, family history, social history, medications reviewed, updated, reconciled.   I reviewed his recent evaluation and treatment at spine care on 11/23/21.   I reviewed his options of further evaluation and treatment.   We discussed possible etiology including knee contusion, patellar tendinopathy given his activity level, osteoarthritis given his history, possibility of repeated internal derangement.   Due to history of osteopenia, history of fractures, xray collected today. Awaiting final report.   Due to chronic nature of his pain and history of internal derangement, option of further imaging like MRI was discussed and the patient would like this scheduled as well. Will follow results. Further treatment and evaluation pending results today. Was encouraged to continue low impact exercise. Declines pain medication. Ice was not as beneficial for him, may consider heat application.           Subjective  [age] old male here with concerns of right knee pain.   This started about three months ago. It is intermittent, present with kneeling and sometimes with walking. Going up and down stairs is ok. This pain is different, he has had meniscal injury in the past and used to see orthopedic surgery. This pain is sharp, shooting, located in the front of the knee below the kneecap only.   He is worried about the pain, he has history of multiple fractures and osteopenia. Is working with an outside provider. His calcium and PTH levels have been normal. He is very active. There is no recent fall or injury. There is no knee swelling, catching, warmth. Has not really needed pain medication. Has not participated in PT for this, but is working with spine care for low back pain including spondylolisthesis, foraminal stenosis. He has  history of right foot drop after surgery for a toe fracture.       ROS: 12 systems reviewed, all negative exceptfor what is mentioned in HPI.   Past Medical History:   Diagnosis Date     Actinic keratosis      Coronary artery disease      Hepatitis C     S/p pegasys and rebatol tx in early 2000s     Hepatitis C      Hyperlipidemia      Hyperlipidemia LDL goal <70      Hypothyroid      Hypothyroidism      Osteopenia      RBBB      Sensorineural hearing loss, unilateral     Right ear     Situational anxiety     Flying     Syncope      Patient Active Problem List   Diagnosis     Hypothyroidism, unspecified type     Hyperlipidemia LDL goal <70     CAD (coronary artery disease)     Tendinopathy of rotator cuff     Tear of right glenoid labrum     Syncope and collapse     Subjective tinnitus of right ear     Situational anxiety     Sensorineural hearing loss, asymmetrical     Sebaceous cyst     Primary osteoarthritis of left knee     Personal history of alcoholism (H)     Other and unspecified special symptom or syndrome, not elsewhere classified     Nasal septal deviation     Low bone mass     Hypertrophy of inferior nasal turbinate     Hearing loss in right ear     Environmental allergies     Closed fracture of left distal fibula     Chronic sinusitis     Biceps tendinopathy of right upper extremity     Backache     Pes cavus     Past Surgical History:   Procedure Laterality Date     ARTHROSCOPY KNEE       ARTHROSCOPY KNEE WITH MENISCECTOMY Bilateral      ARTHROSCOPY SHOULDER       FOOT FRACTURE SURGERY       OPEN REDUCTION INTERNAL FIXATION FOREARM Right      OTHER SURGICAL HISTORY       duodenal ulcer resection     Septoplasty       SURGICAL PATHOLOGY EXAM      Parotid mass excision     Family History   Problem Relation Age of Onset     Hypothyroidism Sister      Cancer Sister      Hypothyroidism Brother      Cerebrovascular Disease No family hx of      Diabetes No family hx of      Colon Cancer No family hx of       Heart Disease No family hx of      Hypertension No family hx of      Valvular heart disease Mother      Sudden Death Brother      Social History     Socioeconomic History     Marital status:      Spouse name: Not on file     Number of children: Not on file     Years of education: Not on file     Highest education level: Not on file   Occupational History     Not on file   Tobacco Use     Smoking status: Former Smoker     Packs/day: 1.00     Years: 20.00     Pack years: 20.00     Types: Cigarettes     Smokeless tobacco: Never Used   Substance and Sexual Activity     Alcohol use: Not Currently     Drug use: Not Currently     Sexual activity: Not on file   Other Topics Concern     Not on file   Social History Narrative     Not on file     Social Determinants of Health     Financial Resource Strain: Not on file   Food Insecurity: Not on file   Transportation Needs: Not on file   Physical Activity: Not on file   Stress: Not on file   Social Connections: Not on file   Intimate Partner Violence: Not on file   Housing Stability: Not on file     Current Outpatient Medications   Medication     aspirin (ASA) 81 MG chewable tablet     atorvastatin (LIPITOR) 40 MG tablet     gabapentin (NEURONTIN) 100 MG capsule     ibuprofen (ADVIL/MOTRIN) 200 MG tablet     levothyroxine (SYNTHROID/LEVOTHROID) 112 MCG tablet     meloxicam (MOBIC) 15 MG tablet     No current facility-administered medications for this visit.     Objective  /77 (BP Location: Left arm, Patient Position: Sitting, Cuff Size: Adult Regular)   Pulse 59   Temp 97.4  F (36.3  C) (Oral)   Wt 69.9 kg (154 lb)   BMI 22.74 kg/m      General Appearance:  Alert, cooperative, no distress, appears stated age   Head:  Normocephalic, without obvious abnormality, atraumatic   Eyes:  PERRL, conjunctiva/corneas clear, EOM's intact   Throat: Lips, mucosa, and tongue normal; teeth and gums normal   Neck: Supple   Extremities: Right knee without edema or effusion,  normal extension and flexion, no signs of internal derangement, tender over patellar tendon   Skin: Skin color, texture, turgor normal, no rashes or lesions   Neurologic: Normal strength and tone       Xray  pending

## 2022-01-04 ENCOUNTER — HOSPITAL ENCOUNTER (OUTPATIENT)
Dept: MRI IMAGING | Facility: HOSPITAL | Age: 62
Discharge: HOME | End: 2022-01-04
Attending: FAMILY MEDICINE | Admitting: FAMILY MEDICINE
Payer: COMMERCIAL

## 2022-01-04 DIAGNOSIS — M25.561 CHRONIC PAIN OF RIGHT KNEE: ICD-10-CM

## 2022-01-04 DIAGNOSIS — G89.29 CHRONIC PAIN OF RIGHT KNEE: ICD-10-CM

## 2022-01-04 PROCEDURE — 73721 MRI JNT OF LWR EXTRE W/O DYE: CPT | Mod: RT

## 2022-01-10 DIAGNOSIS — F40.243 FLYING PHOBIA: Primary | ICD-10-CM

## 2022-01-10 RX ORDER — DIAZEPAM 5 MG
5 TABLET ORAL EVERY 6 HOURS PRN
Qty: 5 TABLET | Refills: 0 | Status: SHIPPED | OUTPATIENT
Start: 2022-01-10 | End: 2022-04-04

## 2022-01-10 NOTE — PROGRESS NOTES
Brief note:  Steve is a patient well-known to me who suffers from flight anxiety.  He has a flight to [place] tomorrow morning and is nearly out of diazepam which he uses as needed for severe anxiety during flights.  He had 10 tablets filled in May 2021 and has used these gradually over the course of the past 8 months.  The prescription monitoring database reflects controlled substances ordered by me or other providers at this clinic.  This should was discussed with accepting provider Dr. Nava who agrees to sign off on 5 tablets to help get patient through this trip.    Tato Holloway MD PGY3  [facility] Medicine

## 2022-01-11 ENCOUNTER — TELEPHONE (OUTPATIENT)
Dept: FAMILY MEDICINE | Facility: CLINIC | Age: 62
End: 2022-01-11
Payer: COMMERCIAL

## 2022-01-11 NOTE — TELEPHONE ENCOUNTER
Called pt and relayed below message.  Pt to call back if needs to to have Dr. Burk call him back. Thanks          ----- Message from Regan Burk MD sent at 1/11/2022  3:41 PM CST -----  Please call. I left him a message detailing my plan, his MRI is abnormal and needs orthopedics evaluation, I placed a referral already. If he would like me to discuss more let me know I will call him again.

## 2022-01-13 NOTE — TELEPHONE ENCOUNTER
DIAGNOSIS: Chronic right knee pain/complex tear of medial meniscus of right knee/XR MRI/Regan Burk   APPOINTMENT DATE: 2.7.22   NOTES STATUS DETAILS   OFFICE NOTE from referring provider Internal 12.30.21 Dr Regan Burk, [facility]   OFFICE NOTE from other specialist N/A    DISCHARGE SUMMARY from hospital N/A    DISCHARGE REPORT from the ER N/A    OPERATIVE REPORT N/A    EMG report N/A    MEDICATION LIST Internal    MRI Internal 1.4.22 R knee   DEXA (osteoporosis/bone health) Internal    CT SCAN N/A    XRAYS (IMAGES & REPORTS) Internal 12.30.21 R knee

## 2022-02-03 ENCOUNTER — HOSPITAL ENCOUNTER (OUTPATIENT)
Dept: PHYSICAL THERAPY | Facility: REHABILITATION | Age: 62
End: 2022-02-03
Payer: COMMERCIAL

## 2022-02-03 DIAGNOSIS — M43.16 SPONDYLOLISTHESIS OF LUMBAR REGION: ICD-10-CM

## 2022-02-03 DIAGNOSIS — M48.061 FORAMINAL STENOSIS OF LUMBAR REGION: ICD-10-CM

## 2022-02-03 DIAGNOSIS — M25.651 DECREASED RANGE OF RIGHT HIP MOVEMENT: ICD-10-CM

## 2022-02-03 DIAGNOSIS — M50.30 DDD (DEGENERATIVE DISC DISEASE), CERVICAL: ICD-10-CM

## 2022-02-03 DIAGNOSIS — R20.2 PARESTHESIA OF BOTH HANDS: ICD-10-CM

## 2022-02-03 DIAGNOSIS — M54.50 LUMBAR SPINE PAIN: ICD-10-CM

## 2022-02-03 PROCEDURE — 97161 PT EVAL LOW COMPLEX 20 MIN: CPT | Mod: GP | Performed by: PHYSICAL THERAPIST

## 2022-02-03 PROCEDURE — 97110 THERAPEUTIC EXERCISES: CPT | Mod: GP | Performed by: PHYSICAL THERAPIST
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
## 2022-02-07 ENCOUNTER — OFFICE VISIT (OUTPATIENT)
Dept: ORTHOPEDICS | Facility: CLINIC | Age: 62
End: 2022-02-07
Attending: FAMILY MEDICINE
Payer: COMMERCIAL

## 2022-02-07 ENCOUNTER — PRE VISIT (OUTPATIENT)
Dept: ORTHOPEDICS | Facility: CLINIC | Age: 62
End: 2022-02-07
Payer: COMMERCIAL

## 2022-02-07 VITALS — WEIGHT: 155 LBS | HEIGHT: 69 IN | BODY MASS INDEX: 22.96 KG/M2

## 2022-02-07 DIAGNOSIS — G89.29 CHRONIC PAIN OF RIGHT KNEE: ICD-10-CM

## 2022-02-07 DIAGNOSIS — M25.561 CHRONIC PAIN OF RIGHT KNEE: ICD-10-CM

## 2022-02-07 DIAGNOSIS — E03.9 HYPOTHYROIDISM, UNSPECIFIED TYPE: ICD-10-CM

## 2022-02-07 DIAGNOSIS — S83.231A COMPLEX TEAR OF MEDIAL MENISCUS OF RIGHT KNEE, UNSPECIFIED WHETHER OLD OR CURRENT TEAR, INITIAL ENCOUNTER: ICD-10-CM

## 2022-02-07 DIAGNOSIS — E78.5 HYPERLIPIDEMIA LDL GOAL <70: ICD-10-CM

## 2022-02-07 PROCEDURE — 99203 OFFICE O/P NEW LOW 30 MIN: CPT | Mod: GC | Performed by: ORTHOPAEDIC SURGERY

## 2022-02-07 RX ORDER — ATORVASTATIN CALCIUM 40 MG/1
40 TABLET, FILM COATED ORAL DAILY
Qty: 90 TABLET | Refills: 1 | Status: SHIPPED | OUTPATIENT
Start: 2022-02-07 | End: 2022-08-08

## 2022-02-07 RX ORDER — LEVOTHYROXINE SODIUM 112 UG/1
TABLET ORAL
Qty: 90 TABLET | Refills: 0 | Status: SHIPPED | OUTPATIENT
Start: 2022-02-07 | End: 2022-05-04

## 2022-02-07 ASSESSMENT — MIFFLIN-ST. JEOR: SCORE: 1488.46

## 2022-02-07 NOTE — PROGRESS NOTES
Patient seen and examined with the Resident.     Assesment: Right knee medial compartment chondrosis, medial meniscus tear    Plan: I long discussion with the patient at this time he is overall really doing quite well and is really quite functional.  In light of this information I do not think that any surgery is necessary.  I do think that if he is having symptoms going forward and he fails to see improvement from activity modification we could consider things such as p.o. anti-inflammatory like diclofenac, consideration of a corticosteroid injection or discussion regarding surgery.  None of these things are required and which should only be pursued if he fails to see lasting improvement after nonsurgical intervention.  She can follow-up with me on an as-needed basis    I agree with history, physical and imaging as well as the assessment and plan as detailed by Dr. Corral.

## 2022-02-07 NOTE — PROGRESS NOTES
CHIEF CONCERN: right anterior knee pain    HISTORY:   61-year-old male with a history of bilateral meniscus tears status post arthroscopic meniscectomies (twice per side; most recently 15 years ago) who presents for a several month history of mild right anterior knee pain that is worse with kneeling and running downhill.  His symptoms have nearly completely resolved with a period of rest.  He is taking ibuprofen and meloxicam for his pain.  Has not attempted bracing or formal physical therapy.  Reports a history of one prior knee injection many years ago.  He states his symptoms are not causing significant quality of life or activity limitations.    PAST MEDICAL HISTORY: (Reviewed with the patient and in the EPIC medical record)  Noncontributory    PAST SURGICAL HISTORY: (Reviewed with the patient and in the EPIC medical record)  Bilateral arthroscopic meniscectomies (twice per side; most recently 15 years ago)    MEDICATIONS: (Reviewed with the patient and in the EPIC medical record)  As above    ALLERGIES: (Reviewed with the patient and in the EPIC medical record)  1. sulfa    SOCIAL HISTORY: (Reviewed with the patient and in the medical record)  --Tobacco: Non-smoker  --Occupation: Recently retired psychotherapist  --Avocation/Sport: Hiking, running    FAMILY HISTORY: (Reviewed with the patient and in the medical record)  -- No family history of bleeding, clotting, or difficulty with anesthesia    REVIEW OF SYSTEMS: (Reviewed with the patient and on the health intake form)  -- A comprehensive 10 point review of systems was conducted and is negative except as noted in the HPI    EXAM:     General: Awake, Alert and Oriented, No acute Distress. Articulate and Interactive    Body mass index is 22.89 kg/m .    Right Lower extremity :    Skin is Warm and Well perfused, no suggestion of infection    TTP: no focal tenderness to palpation    ROM: 0-140 without pain    ACL: Grade 1A Lachman and anterior drawer (grade 0 on  contralateral side)    PCL: Grade 0 posterior drawer    MCL: no laxity to valgus stress    LCL: no laxity to varus stress    Meniscus: negative Steven's test.  Negative Thessaly test.  Negative deep squat test.    EHL/FHL/TA/GS 5/5    Sensation intact L3-S1    2+ Dorsalis Pedis Pulse    IMAGING:    Plain Radiographs: 3 view right knee radiographs obtained 12/30/2021 independently reviewed and notable for mild medial and lateral compartment tibiofemoral joint space narrowing.  No significant patellofemoral joint space narrowing.  No acute osseous abnormalities    MRI: Right knee MRI obtained 1/4/2022 independently reviewed and notable for undersurface tearing of the posterior horn of the medial meniscus and body.  Degenerative fraying of the lateral meniscus anterior horn.  Moderate medial and lateral compartment chondromalacia.  Collateral and cruciate ligaments intact.  No acute osseous abnormalities.    ASSESSMENT: 61M with degenerative right medial and lateral meniscus tears.  History of bilateral knee arthroscopies and partial meniscectomies.  Symptoms not severe or limiting activities of daily living/quality of life.    PLAN:  -Recommended continued nonoperative management with over-the-counter pain medications and activity modification as needed.  Patient was accepting of this recommendation and states he is not interested in surgery currently.  - Should his symptoms worsen, he could consider corticosteroid injection versus diagnostic arthroscopy and possible partial meniscectomy.  -Follow-up as needed    Patient was seen and discussed with Dr. Kenyatta Corral MD  PGY-5, Orthopaedic Surgery        Answers for HPI/ROS submitted by the patient on 2/7/2022  General Symptoms: No  Skin Symptoms: No  HENT Symptoms: No  EYE SYMPTOMS: No  HEART SYMPTOMS: No  LUNG SYMPTOMS: No  INTESTINAL SYMPTOMS: No  URINARY SYMPTOMS: No  REPRODUCTIVE SYMPTOMS: No  SKELETAL SYMPTOMS: No  BLOOD SYMPTOMS: No  NERVOUS  SYSTEM SYMPTOMS: No  MENTAL HEALTH SYMPTOMS: No

## 2022-02-07 NOTE — LETTER
2/7/2022         RE: Steve Jin  [address]  [place] [address]        Dear Colleague,    Thank you for referring your patient, Steve Jin, to the [facility] [place]. Please see a copy of my visit note below.    CHIEF CONCERN: right anterior knee pain    HISTORY:   61-year-old male with a history of bilateral meniscus tears status post arthroscopic meniscectomies (twice per side; most recently 15 years ago) who presents for a several month history of mild right anterior knee pain that is worse with kneeling and running downhill.  His symptoms have nearly completely resolved with a period of rest.  He is taking ibuprofen and meloxicam for his pain.  Has not attempted bracing or formal physical therapy.  Reports a history of one prior knee injection many years ago.  He states his symptoms are not causing significant quality of life or activity limitations.    PAST MEDICAL HISTORY: (Reviewed with the patient and in the EPIC medical record)  Noncontributory    PAST SURGICAL HISTORY: (Reviewed with the patient and in the EPIC medical record)  Bilateral arthroscopic meniscectomies (twice per side; most recently 15 years ago)    MEDICATIONS: (Reviewed with the patient and in the [facility] medical record)  As above    ALLERGIES: (Reviewed with the patient and in the [facility] medical record)  1. sulfa    SOCIAL HISTORY: (Reviewed with the patient and in the medical record)  --Tobacco: Non-smoker  --Occupation: Recently retired psychotherapist  --Avocation/Sport: Hiking, running    FAMILY HISTORY: (Reviewed with the patient and in the medical record)  -- No family history of bleeding, clotting, or difficulty with anesthesia    REVIEW OF SYSTEMS: (Reviewed with the patient and on the health intake form)  -- A comprehensive 10 point review of systems was conducted and is negative except as noted in the HPI    EXAM:     General: Awake, Alert and Oriented, No acute Distress.  Articulate and Interactive    Body mass index is 22.89 kg/m .    Right Lower extremity :    Skin is Warm and Well perfused, no suggestion of infection    TTP: no focal tenderness to palpation    ROM: 0-140 without pain    ACL: Grade 1A Lachman and anterior drawer (grade 0 on contralateral side)    PCL: Grade 0 posterior drawer    MCL: no laxity to valgus stress    LCL: no laxity to varus stress    Meniscus: negative Steven's test.  Negative Thessaly test.  Negative deep squat test.    EHL/FHL/TA/GS 5/5    Sensation intact L3-S1    2+ Dorsalis Pedis Pulse    IMAGING:    Plain Radiographs: 3 view right knee radiographs obtained 12/30/2021 independently reviewed and notable for mild medial and lateral compartment tibiofemoral joint space narrowing.  No significant patellofemoral joint space narrowing.  No acute osseous abnormalities    MRI: Right knee MRI obtained 1/4/2022 independently reviewed and notable for undersurface tearing of the posterior horn of the medial meniscus and body.  Degenerative fraying of the lateral meniscus anterior horn.  Moderate medial and lateral compartment chondromalacia.  Collateral and cruciate ligaments intact.  No acute osseous abnormalities.    ASSESSMENT: 61M with degenerative right medial and lateral meniscus tears.  History of bilateral knee arthroscopies and partial meniscectomies.  Symptoms not severe or limiting activities of daily living/quality of life.    PLAN:  -Recommended continued nonoperative management with over-the-counter pain medications and activity modification as needed.  Patient was accepting of this recommendation and states he is not interested in surgery currently.  - Should his symptoms worsen, he could consider corticosteroid injection versus diagnostic arthroscopy and possible partial meniscectomy.  -Follow-up as needed    Patient was seen and discussed with Dr. eKnyatta Corral MD  PGY-5, Orthopaedic Surgery        Answers for HPI/ROS  submitted by the patient on 2/7/2022  General Symptoms: No  Skin Symptoms: No  HENT Symptoms: No  EYE SYMPTOMS: No  HEART SYMPTOMS: No  LUNG SYMPTOMS: No  INTESTINAL SYMPTOMS: No  URINARY SYMPTOMS: No  REPRODUCTIVE SYMPTOMS: No  SKELETAL SYMPTOMS: No  BLOOD SYMPTOMS: No  NERVOUS SYSTEM SYMPTOMS: No  MENTAL HEALTH SYMPTOMS: No        Patient seen and examined with the Resident.     Assesment: Right knee medial compartment chondrosis, medial meniscus tear    Plan: I long discussion with the patient at this time he is overall really doing quite well and is really quite functional.  In light of this information I do not think that any surgery is necessary.  I do think that if he is having symptoms going forward and he fails to see improvement from activity modification we could consider things such as p.o. anti-inflammatory like diclofenac, consideration of a corticosteroid injection or discussion regarding surgery.  None of these things are required and which should only be pursued if he fails to see lasting improvement after nonsurgical intervention.  She can follow-up with me on an as-needed basis    I agree with history, physical and imaging as well as the assessment and plan as detailed by Dr. Corral.       Alfredo You MD

## 2022-02-09 ENCOUNTER — OFFICE VISIT (OUTPATIENT)
Dept: AUDIOLOGY | Facility: CLINIC | Age: 62
End: 2022-02-09
Payer: COMMERCIAL

## 2022-02-09 DIAGNOSIS — H90.3 SENSORINEURAL HEARING LOSS, BILATERAL: Primary | ICD-10-CM

## 2022-02-09 PROCEDURE — 92550 TYMPANOMETRY & REFLEX THRESH: CPT | Performed by: AUDIOLOGIST

## 2022-02-09 PROCEDURE — 92557 COMPREHENSIVE HEARING TEST: CPT | Performed by: AUDIOLOGIST

## 2022-02-09 PROCEDURE — 92593 PR HEARING AID CHECK, BINAURAL: CPT | Performed by: AUDIOLOGIST

## 2022-02-09 NOTE — PROGRESS NOTES
AUDIOLOGY REPORT    SUBJECTIVE:  Steve Jin is a 61 year old male who was seen in the Audiology Clinic at the was seen in Audiology at the [facility] and [facility] for audiologic evaluation and to transfer care of his hearing aids to our clinic. The patient reports asymmetric hearing loss with the right ear non-functional after a sudden hearing loss in the 90's. He is currently using an OtFooda Opn 1 S BiCROS system (serial number [id number]) which was fit at an outside clinic. He also reports right sided tinnitus. The patient denies pain, dizziness or ear surgeries.  The patient reports that he is overall happy with his hearing aids, however he still has issues with background noise. He was previously having issues with the streaming cutting in and out but has not had an issue since upgrading his phone. Lastly, he reported that the push button on his right CROS transmitter sometimes does not work.    OBJECTIVE:  Otoscopic exam indicates ears are clear of cerumen bilaterally     Pure Tone Thresholds assessed using conventional audiometry with good  reliability from 250-8000 Hz bilaterally using insert earphones and circumaural headphones     RIGHT:  Severe to profound sensorineural hearing loss    LEFT:    Normal to moderate sensorineural hearing loss    Tympanogram:    RIGHT: normal eardrum mobility    LEFT:   normal eardrum mobility    Reflexes (reported by stimulus ear):  RIGHT: Ipsilateral is absent at frequencies tested  RIGHT: Contralateral is absent at frequencies tested  LEFT:   Ipsilateral is present at normal levels  LEFT:   Contralateral is present at normal levels      Speech Reception Threshold:    RIGHT: 85 dB HL (SDT)    LEFT:   25 dB HL  Word Recognition Score:     RIGHT: 0% at 105 dB HL using NU-6 recorded word list.    LEFT:   100% at 65 dB HL using NU-6 recorded word list.    The hearing aids were cleaned and checked. A third manual program with fixed  directionality was added and noise reduction set at maximum. We discussed realistic expectations for listening in background noise, particularly as a one eared listener. His CROS transmitter is in warranty until February 2023 however he opted not to send it in for repairing of the push button.      ASSESSMENT:   Hearing test and hearing aid check completed. Changes noted above.    PLAN:    Patient will return in one year for hearing test and hearing aid check, sooner per concern. Please contact this clinic with any questions or concerns.

## 2022-02-11 ENCOUNTER — TELEPHONE (OUTPATIENT)
Dept: PHYSICAL THERAPY | Facility: REHABILITATION | Age: 62
End: 2022-02-11

## 2022-02-11 NOTE — TELEPHONE ENCOUNTER
Called patient regarding missed appointment today for PT.  Patient forgot and intends to be at next scheduled visit on 2/11/2022.  Mikayla Cronin, PT

## 2022-02-11 NOTE — TELEPHONE ENCOUNTER
Talked with patient regarding missed appointment today.  Patient forgot and will be at next scheduled visit on 2/18.  Mikayla Cronin, PT

## 2022-02-11 NOTE — TELEPHONE ENCOUNTER
Error in documentation.  Note got linked to this date of 12/27/21 and this is incorrect.  Should have been linked to date of 2/11/2022.  Mikayla Cronin, PT

## 2022-02-18 ENCOUNTER — HOSPITAL ENCOUNTER (OUTPATIENT)
Dept: PHYSICAL THERAPY | Facility: REHABILITATION | Age: 62
End: 2022-02-18
Payer: COMMERCIAL

## 2022-02-18 DIAGNOSIS — M50.30 DDD (DEGENERATIVE DISC DISEASE), CERVICAL: Primary | ICD-10-CM

## 2022-02-18 DIAGNOSIS — M48.061 FORAMINAL STENOSIS OF LUMBAR REGION: ICD-10-CM

## 2022-02-18 DIAGNOSIS — M54.50 LUMBAR SPINE PAIN: ICD-10-CM

## 2022-02-18 DIAGNOSIS — M43.16 SPONDYLOLISTHESIS OF LUMBAR REGION: ICD-10-CM

## 2022-02-18 PROCEDURE — 97110 THERAPEUTIC EXERCISES: CPT | Mod: GP | Performed by: PHYSICAL THERAPIST

## 2022-02-18 NOTE — PROGRESS NOTES
02/18/22 1100   Signing Clinician's Name / Credentials   Signing clinician's name / credentials Mikayla Cronin, PT   Session Number   Session Number 2/10-12    Progress Note/Recertification   Progress Note Due Date 04/28/22   Adult Goals   PT Ortho Eval Goals 1;2;3;4   Ortho Goal 1   Goal Identifier HEP    Goal Description Pt be independent in HEP to manage symptoms in R hip and low back.    Goal Progress reports he hasn't done ex that much, somewhat unmotivated he reports   Target Date 04/28/22   Ortho Goal 2   Goal Identifier Sitting    Goal Description Pt will sit for 60 min without low back pain >2/10 to allow for ease with reading.    Target Date 04/28/22   Ortho Goal 3   Goal Identifier Walking    Goal Description Pt will walk/hike for 1-2 hours without pain in R hip >2/10 to allow for ease with exercise.    Goal Progress able to xc ski x 45 minutes/ skate-ski and loosens up after doing activity   Target Date 04/14/22   Ortho Goal 4   Goal Identifier Sleeping    Goal Description Pt will sleep without R hip pain - not waking up due to pain.    Target Date 03/31/22   Subjective Report   Subjective Report R hip pain and knee pain torn cartilage. Low impact can xc ski.  R hip pain 2/10 at rest, LBP lying on back at night 2/10, L>R knee pain 2/10.  45-60 minutes feels better after xc ski.; From eval: had Najera fx R 5th metatarsal and foot drop which has resolved. Increased LBP and R hip pain as used crutches. Hx of R femur fx and also has forma stenosis and spondylolisthesis of lumbar   Objective Measures   Objective Measures Objective Measure 1;Objective Measure 2   Objective Measure 1   Objective Measure lumbar rom   Details lumbar flex with fingertips to knees, ext mod/sunny loss, darien lateral flex mod/sunny loss   Objective Measure 2   Objective Measure hip extension R prone   Details grade 2/5   Treatment Interventions   Interventions Therapeutic Procedure/Exercise   Therapeutic Procedure/exercise   Therapeutic  Procedures: strength, endurance, ROM, flexibillity minutes (63496) 25   Skilled Intervention therapeutic exercise   Patient Response tightness noted at right hip and lumbar spine, but tolerates well   Treatment Detail addded alt knee flex x 10, prone hip rotation x 10, prone over pillow x 2' and karlene x 5 breaths and bow and arrow stretch x 1-2; reviewed R hip abd on side, hip flexor stretch supine, frog stretch   Progress good progression of stretching today and motivated to have STG of 1 month of daily stretching   Assessments Completed   Assessments Completed lumbar flex to ankles, lateral flex and ext mod loos of lumba spine motion   Education   Learner Patient   Readiness Acceptance   Method Demonstration   Response Demonstrates Understanding   Plan   Homework PTRX program started- exercises printed    Home program Frog (abd/ hip IR) stretch, hip abd s/l, hip flexor stretch with leg off the table. bow and arrow, 1/2 kneel hip flexor stretch, prone, karlene, prone alt. knee flex, and prone hip IR/ER   Updates to plan of care added karlene, alt knee flex, prone hip ER/IR, 1/2 kneel hip flexor stretch and bow and arrow   Plan for next session avoiding gym due to covid at this time; review all ex and begin lumbar stab ex   Comments   Comments Pain in low back and R hip primary concern and has not been doing ex. Very tight hip, lumbar and pelvic mobility.  Chronic degenerative changes of lumbar spine including spondylolisthesis and stenosis and old R femur fx affecting pain and progression of activity/exercise.   Total Session Time   Timed Code Treatment Minutes 25   Total Treatment Time (sum of timed and untimed services) 30   AMBULATORY CLINICS ONLY-MEDICAL AND TREATMENT DIAGNOSIS   Medical Diagnosis  lumbar spine pain, spondylolisthesis of lumbar region, foraminal stenosis of lumbar region, decreased ROM of R hip.    PT Diagnosis decreased right hip ROM, low back pain, right hip pain    Mikayla Cronin, PT

## 2022-03-02 ENCOUNTER — HOSPITAL ENCOUNTER (OUTPATIENT)
Dept: PHYSICAL THERAPY | Facility: REHABILITATION | Age: 62
End: 2022-03-02
Payer: COMMERCIAL

## 2022-03-02 DIAGNOSIS — M54.50 LUMBAR SPINE PAIN: ICD-10-CM

## 2022-03-02 DIAGNOSIS — M48.061 FORAMINAL STENOSIS OF LUMBAR REGION: ICD-10-CM

## 2022-03-02 DIAGNOSIS — M79.671 RIGHT FOOT PAIN: ICD-10-CM

## 2022-03-02 DIAGNOSIS — M25.651 DECREASED RANGE OF RIGHT HIP MOVEMENT: ICD-10-CM

## 2022-03-02 DIAGNOSIS — M50.30 DDD (DEGENERATIVE DISC DISEASE), CERVICAL: Primary | ICD-10-CM

## 2022-03-02 DIAGNOSIS — M43.16 SPONDYLOLISTHESIS OF LUMBAR REGION: ICD-10-CM

## 2022-03-02 DIAGNOSIS — R20.2 PARESTHESIA OF BOTH HANDS: ICD-10-CM

## 2022-03-02 PROCEDURE — 97110 THERAPEUTIC EXERCISES: CPT | Mod: GP | Performed by: PHYSICAL THERAPIST

## 2022-05-03 DIAGNOSIS — E03.9 HYPOTHYROIDISM, UNSPECIFIED TYPE: ICD-10-CM

## 2022-05-04 RX ORDER — LEVOTHYROXINE SODIUM 112 UG/1
112 TABLET ORAL DAILY
Qty: 90 TABLET | Refills: 0 | Status: SHIPPED | OUTPATIENT
Start: 2022-05-04 | End: 2022-08-08

## 2022-05-05 DIAGNOSIS — Z00.00 HEALTHCARE MAINTENANCE: Primary | ICD-10-CM

## 2022-05-05 NOTE — PROGRESS NOTES
Brief Note:  Future orders put in for screening labs. Lipids have been stable on atorvastatin however pt request annual screen. This is reasonable. Also requesting to add on PSA. Pt needs TSH as well. All orders placed and pt to schedule with lab.    Tato Holloway MD PGY3  [facility]    Addendum: orders also placed for colonoscopy screening

## 2022-05-07 NOTE — PROGRESS NOTES
05/07/22   GASTROENTEROLOGY REFERRAL  [facility]  Phone [phone]  Fax: [phone]    Online referral placed with [facility] who will contact patient to schedule.     Andie Jarvis

## 2022-05-10 ENCOUNTER — APPOINTMENT (OUTPATIENT)
Dept: LAB | Facility: CLINIC | Age: 62
End: 2022-05-10
Payer: COMMERCIAL

## 2022-05-10 ENCOUNTER — LAB (OUTPATIENT)
Dept: LAB | Facility: CLINIC | Age: 62
End: 2022-05-10
Payer: COMMERCIAL

## 2022-05-10 DIAGNOSIS — E03.9 HYPOTHYROIDISM, UNSPECIFIED TYPE: ICD-10-CM

## 2022-05-10 DIAGNOSIS — Z00.00 HEALTHCARE MAINTENANCE: ICD-10-CM

## 2022-05-10 LAB
CHOLEST SERPL-MCNC: 158 MG/DL
FASTING STATUS PATIENT QL REPORTED: YES
HDLC SERPL-MCNC: 81 MG/DL
LDLC SERPL CALC-MCNC: 60 MG/DL
PSA SERPL-MCNC: 0.63 UG/L (ref 0–4.5)
TRIGL SERPL-MCNC: 85 MG/DL
TSH SERPL DL<=0.005 MIU/L-ACNC: 1.03 UIU/ML (ref 0.3–5)

## 2022-05-10 PROCEDURE — 84443 ASSAY THYROID STIM HORMONE: CPT

## 2022-05-10 PROCEDURE — 80061 LIPID PANEL: CPT

## 2022-05-10 PROCEDURE — 36415 COLL VENOUS BLD VENIPUNCTURE: CPT

## 2022-05-10 PROCEDURE — 84153 ASSAY OF PSA TOTAL: CPT

## 2022-05-10 PROCEDURE — 82274 ASSAY TEST FOR BLOOD FECAL: CPT

## 2022-05-13 DIAGNOSIS — Z00.00 ROUTINE GENERAL MEDICAL EXAMINATION AT A HEALTH CARE FACILITY: ICD-10-CM

## 2022-05-13 LAB — HEMOCCULT STL QL IA: NEGATIVE

## 2022-05-16 ENCOUNTER — OFFICE VISIT (OUTPATIENT)
Dept: FAMILY MEDICINE | Facility: CLINIC | Age: 62
End: 2022-05-16
Payer: COMMERCIAL

## 2022-05-16 VITALS
HEIGHT: 69 IN | TEMPERATURE: 97.5 F | OXYGEN SATURATION: 100 % | WEIGHT: 153 LBS | DIASTOLIC BLOOD PRESSURE: 74 MMHG | SYSTOLIC BLOOD PRESSURE: 111 MMHG | RESPIRATION RATE: 16 BRPM | BODY MASS INDEX: 22.66 KG/M2 | HEART RATE: 70 BPM

## 2022-05-16 DIAGNOSIS — E78.5 HYPERLIPIDEMIA LDL GOAL <70: ICD-10-CM

## 2022-05-16 DIAGNOSIS — E03.9 HYPOTHYROIDISM, UNSPECIFIED TYPE: ICD-10-CM

## 2022-05-16 DIAGNOSIS — M54.17 LUMBOSACRAL RADICULOPATHY AT L5: ICD-10-CM

## 2022-05-16 DIAGNOSIS — R35.1 BENIGN PROSTATIC HYPERPLASIA WITH NOCTURIA: ICD-10-CM

## 2022-05-16 DIAGNOSIS — N40.1 BENIGN PROSTATIC HYPERPLASIA WITH NOCTURIA: ICD-10-CM

## 2022-05-16 DIAGNOSIS — Z00.00 ROUTINE GENERAL MEDICAL EXAMINATION AT A HEALTH CARE FACILITY: Primary | ICD-10-CM

## 2022-05-16 DIAGNOSIS — Z23 HIGH PRIORITY FOR 2019-NCOV VACCINE: ICD-10-CM

## 2022-05-16 PROCEDURE — 91305 COVID-19,PF,PFIZER (12+ YRS): CPT | Performed by: STUDENT IN AN ORGANIZED HEALTH CARE EDUCATION/TRAINING PROGRAM

## 2022-05-16 PROCEDURE — 90715 TDAP VACCINE 7 YRS/> IM: CPT | Performed by: STUDENT IN AN ORGANIZED HEALTH CARE EDUCATION/TRAINING PROGRAM

## 2022-05-16 PROCEDURE — 99396 PREV VISIT EST AGE 40-64: CPT | Mod: 25 | Performed by: STUDENT IN AN ORGANIZED HEALTH CARE EDUCATION/TRAINING PROGRAM

## 2022-05-16 PROCEDURE — 0053A COVID-19,PF,PFIZER (12+ YRS): CPT | Performed by: STUDENT IN AN ORGANIZED HEALTH CARE EDUCATION/TRAINING PROGRAM

## 2022-05-16 PROCEDURE — 90471 IMMUNIZATION ADMIN: CPT | Performed by: STUDENT IN AN ORGANIZED HEALTH CARE EDUCATION/TRAINING PROGRAM

## 2022-05-16 RX ORDER — GABAPENTIN 100 MG/1
100 CAPSULE ORAL 3 TIMES DAILY
Qty: 90 CAPSULE | Refills: 3 | Status: SHIPPED | OUTPATIENT
Start: 2022-05-16 | End: 2023-06-13

## 2022-05-16 RX ORDER — TAMSULOSIN HYDROCHLORIDE 0.4 MG/1
0.4 CAPSULE ORAL DAILY
Qty: 90 CAPSULE | Refills: 1 | Status: SHIPPED | OUTPATIENT
Start: 2022-05-16 | End: 2023-03-15

## 2022-05-16 NOTE — PROGRESS NOTES
Preceptor Attestation:    I discussed the patient with the resident and evaluated the patient in person. I have verified the content of the note, which accurately reflects my assessment of the patient and the plan of care.   Supervising Physician:  Won Moy MD.

## 2022-05-16 NOTE — PROGRESS NOTES
Male Physical Note      Concerns today:     Steve would like to discuss recent TSH results. He is wondering if he should consider decreasing the dose of levothyroxine.     He would like to have his prostate checked. He reports many of his friends are having issues with their prostate. Personally Steve is experiencing nocturia 2-3 times per night. He reports no dribbling, no difficulty initiating a urinary stream, and no feeling of incomplete emptying     Colonoscopy coming up.    He would like to update his vaccinations    He would like to refill gabapentin      ROS:                      CONSTITUTIONAL: no fatigue, no unexpected change in weight  SKIN: no worrisome rashes, no worrisome moles, no worrisome lesions  EYES: no acute vision problems or changes  ENT: no ear problems, no mouth problems, no throat problems  RESP: no significant cough, no shortness of breath  CV: no chest pain, no palpitations, no new or worsening peripheral edema  GI: no nausea, no vomiting, no constipation, no diarrhea    Past Medical History:   Diagnosis Date     Actinic keratosis      Coronary artery disease      Hepatitis C     S/p pegasys and rebatol tx in early 2000s     Hepatitis C      Hyperlipidemia      Hyperlipidemia LDL goal <70      Hypothyroid      Hypothyroidism      Osteopenia      RBBB      Sensorineural hearing loss, unilateral     Right ear     Situational anxiety     Flying     Syncope         Family History   Problem Relation Age of Onset     Hypothyroidism Sister      Cancer Sister      Hypothyroidism Brother      Cerebrovascular Disease No family hx of      Diabetes No family hx of      Colon Cancer No family hx of      Heart Disease No family hx of      Hypertension No family hx of      Valvular heart disease Mother      Sudden Death Brother      Reviewed no other significant FH           Family History and past Medical History reviewed and unchanged/updated.    Social History     Tobacco Use     Smoking status:  "Former Smoker     Packs/day: 1.00     Years: 20.00     Pack years: 20.00     Types: Cigarettes     Smokeless tobacco: Never Used   Substance Use Topics     Alcohol use: Not Currently       Children ? no    Has anyone hurt you physically, for example by pushing, hitting, slapping or kicking you or forcing you to have sex? Denies  Do you feel threatened or controlled by a partner, ex-partner or anyone in your life? Denies    RISK BEHAVIORS AND HEALTHY HABITS:  Tobacco Use/Smoking: None  Illicit Drug Use: None  ETOH: None  Sexually Active: Yes  Diet (5-7 servings of fruits/veg daily): Yes   Exercise (30 min accumulated most days):Yes  Dental Care: Yes   Calcium 1500 mg/d:  Yes  Seat Belt Use: Yes     Cholesterol Level (>46 yo or at risk):  Recommended and patient accepted testing.  Colon CA Screening (>50-75 ):  Recommended and patient accepted testing.  Patient chooses PSA screening.    CV Risk based on Pooled Cohort Risk:  The 10-year ASCVD risk score (Robinlauren RUSSELL Jr., et al., 2013) is: 5.3%    Values used to calculate the score:      Age: 61 years      Sex: Male      Is Non- : No      Diabetic: No      Tobacco smoker: No      Systolic Blood Pressure: 111 mmHg      Is BP treated: No      HDL Cholesterol: 81 mg/dL      Total Cholesterol: 158 mg/dL      Immunization History   Administered Date(s) Administered     COVID-19,PAWEL,Pfizer (12+ Yrs) 01/12/2021, 02/02/2021, 11/22/2021     DTaP, Unspecified 01/24/2012     Influenza (H1N1) 11/10/2009     Influenza Vaccine IM > 6 months Valent IIV4 (Alfuria,Fluzone) 12/01/2017, 10/16/2019     Tdap (Adacel,Boostrix) 01/24/2012     Typhoid IM 03/20/2012     Zoster vaccine recombinant adjuvanted (SHINGRIX) 08/17/2018, 02/01/2019     Reviewed Immunization Record Today    EXAMINATION:  /74 (BP Location: Left arm, Patient Position: Sitting, Cuff Size: Adult Regular)   Pulse 70   Temp 97.5  F (36.4  C) (Oral)   Resp 16   Ht 1.756 m (5' 9.13\")   Wt " 69.4 kg (153 lb)   SpO2 100%   BMI 22.51 kg/m    GENERAL: healthy, alert and no distress  EYES: Eyes grossly normal to inspection, extraocular movements - intact, and PERRL  HENT: ear canals- normal; TMs- normal; Nose- normal; Mouth- no ulcers, no lesions  NECK: no tenderness, no adenopathy, no asymmetry, no masses, no stiffness; thyroid- normal to palpation  RESP: lungs clear to auscultation - no rales, no rhonchi, no wheezes  CV: regular rates and rhythm, normal S1 S2, no S3 or S4 and no murmur, no click or rub -  ABDOMEN: soft, no tenderness, no  hepatosplenomegaly, no masses, normal bowel sounds  MS: extremities- no gross deformities noted, no edema  SKIN: no suspicious lesions, no rashes  NEURO: strength and tone- normal, sensory exam- grossly normal, mentation- intact, speech- normal, reflexes- symmetric  BACK: no CVA tenderness, no paralumbar tenderness  RECTAL- male: no masses, no hemorhoids, Prostate- R lobe appears a bit larger than the left, firm to palpation, no  nodularity, no masses, no hypertrophy  PSYCH: Alert and oriented times 3; speech- coherent , normal rate and volume; able to articulate logical thoughts, able to abstract reason, no tangential thoughts, no hallucinations or delusions, affect- normal  LYMPHATICS: ant. cervical- normal, post. cervical- normal, axillary- normal, supraclavicular- normal, inguinal- normal    ASSESSMENT:  1. Health Care Maintenance: Normal Physical Exam  2. BPH  3. Hypothyroidism  4. CAD/dyslipidemia   5. Lumbosacral radiculopathy     PLAN:  1. Screening updated as indicated, no need for lung ca screening as pt has not smoked in over 15 yrs  2. Trial of flomax ordered. PSA is low and there is no trend toward increase, low concern for prostate Ca  3. Continue at current dose of levothyroxine, asymptomatic pt with normal TSH  4. Lipids at goal, no angina, continue current statin regimen  5. Refill gabapentin as requested.     Tato Holloway MD PGY3  [facility]  Medicine

## 2022-05-23 ENCOUNTER — TRANSFERRED RECORDS (OUTPATIENT)
Dept: HEALTH INFORMATION MANAGEMENT | Facility: CLINIC | Age: 62
End: 2022-05-23
Payer: COMMERCIAL

## 2022-06-27 NOTE — PROGRESS NOTES
[facility]    Outpatient Physical Therapy Discharge Note  Patient: Steve Jin  : 1960    Beginning/End Dates of Reporting Period:  2/3/22 to 3/2/22    Referring Provider: Dr. Greenfield     Therapy Diagnosis: R hip pain      Client Self Report: R hip is main compliant today.  Wants to keep working on strengthening.  Skate skiing has been ok.  Classic hurts more in front of hip.      Objective Measurements:  Objective Measure: R hip ROM  Details: WLF flexion, ER, limited IR with no pain, limitation with hip abdcution in S/L   Objective Measure: hip abd - MMT   Details: grade 4-/5 on L and 4+/5 on R   Objective Measure: DF bilaterally 5/5 grade       Goals:  Goal Identifier HEP    Goal Description Pt be independent in HEP to manage symptoms in R hip and low back.    Target Date 22   Date Met      Progress (detail required for progress note): reports he hasn't done ex that much, somewhat unmotivated he reports     Goal Identifier Sitting    Goal Description Pt will sit for 60 min without low back pain >2/10 to allow for ease with reading.    Target Date 22   Date Met      Progress (detail required for progress note):       Goal Identifier Walking    Goal Description Pt will walk/hike for 1-2 hours without pain in R hip >2/10 to allow for ease with exercise.    Target Date 22   Date Met      Progress (detail required for progress note): able to xc ski x 45 minutes - skate skiing - anterior hip pain with classic skiing      Goal Identifier Sleeping    Goal Description Pt will sleep without R hip pain - not waking up due to pain.    Target Date 22   Date Met      Progress (detail required for progress note):       Goal Identifier     Goal Description     Target Date     Date Met      Progress (detail required for progress note):       Goal Identifier     Goal Description     Target  Date     Date Met      Progress (detail required for progress note):       Goal Identifier     Goal Description     Target Date     Date Met      Progress (detail required for progress note):       Goal Identifier     Goal Description     Target Date     Date Met      Progress (detail required for progress note):         Plan:  Discharge from therapy.    Discharge:    Reason for Discharge: Patient chooses to discontinue therapy.    Equipment Issued: none    Discharge Plan: Patient to continue home program.

## 2022-06-27 NOTE — ADDENDUM NOTE
Encounter addended by: Yazmin Campoverde, PT on: 6/27/2022 9:40 AM   Actions taken: Episode resolved, Clinical Note Signed, Flowsheet accepted

## 2022-07-22 ENCOUNTER — LAB (OUTPATIENT)
Dept: FAMILY MEDICINE | Facility: CLINIC | Age: 62
End: 2022-07-22
Payer: COMMERCIAL

## 2022-07-22 DIAGNOSIS — Z20.822 SUSPECTED COVID-19 VIRUS INFECTION: ICD-10-CM

## 2022-07-22 PROCEDURE — U0005 INFEC AGEN DETEC AMPLI PROBE: HCPCS

## 2022-07-22 PROCEDURE — U0003 INFECTIOUS AGENT DETECTION BY NUCLEIC ACID (DNA OR RNA); SEVERE ACUTE RESPIRATORY SYNDROME CORONAVIRUS 2 (SARS-COV-2) (CORONAVIRUS DISEASE [COVID-19]), AMPLIFIED PROBE TECHNIQUE, MAKING USE OF HIGH THROUGHPUT TECHNOLOGIES AS DESCRIBED BY CMS-2020-01-R: HCPCS

## 2022-07-23 LAB — SARS-COV-2 RNA RESP QL NAA+PROBE: POSITIVE

## 2022-08-08 DIAGNOSIS — E78.5 HYPERLIPIDEMIA LDL GOAL <70: ICD-10-CM

## 2022-08-08 DIAGNOSIS — E03.9 HYPOTHYROIDISM, UNSPECIFIED TYPE: ICD-10-CM

## 2022-08-08 RX ORDER — ATORVASTATIN CALCIUM 40 MG/1
40 TABLET, FILM COATED ORAL DAILY
Qty: 90 TABLET | Refills: 1 | Status: SHIPPED | OUTPATIENT
Start: 2022-08-08 | End: 2023-02-03

## 2022-08-08 RX ORDER — LEVOTHYROXINE SODIUM 112 UG/1
112 TABLET ORAL DAILY
Qty: 90 TABLET | Refills: 0 | Status: SHIPPED | OUTPATIENT
Start: 2022-08-08 | End: 2022-11-02

## 2022-10-10 ENCOUNTER — HEALTH MAINTENANCE LETTER (OUTPATIENT)
Age: 62
End: 2022-10-10

## 2022-11-02 DIAGNOSIS — E03.9 HYPOTHYROIDISM, UNSPECIFIED TYPE: ICD-10-CM

## 2022-11-02 RX ORDER — LEVOTHYROXINE SODIUM 112 UG/1
TABLET ORAL
Qty: 90 TABLET | Refills: 0 | Status: SHIPPED | OUTPATIENT
Start: 2022-11-02 | End: 2023-02-03

## 2022-11-05 ENCOUNTER — IMMUNIZATION (OUTPATIENT)
Dept: FAMILY MEDICINE | Facility: CLINIC | Age: 62
End: 2022-11-05
Payer: COMMERCIAL

## 2022-11-05 PROCEDURE — 90682 RIV4 VACC RECOMBINANT DNA IM: CPT

## 2022-11-05 PROCEDURE — 90471 IMMUNIZATION ADMIN: CPT

## 2023-01-13 ENCOUNTER — TELEPHONE (OUTPATIENT)
Dept: AUDIOLOGY | Facility: CLINIC | Age: 63
End: 2023-01-13

## 2023-02-03 DIAGNOSIS — E03.9 HYPOTHYROIDISM, UNSPECIFIED TYPE: ICD-10-CM

## 2023-02-03 DIAGNOSIS — E78.5 HYPERLIPIDEMIA LDL GOAL <70: ICD-10-CM

## 2023-02-03 RX ORDER — ATORVASTATIN CALCIUM 40 MG/1
TABLET, FILM COATED ORAL
Qty: 90 TABLET | Refills: 1 | Status: SHIPPED | OUTPATIENT
Start: 2023-02-03 | End: 2023-08-14

## 2023-02-03 RX ORDER — LEVOTHYROXINE SODIUM 112 UG/1
TABLET ORAL
Qty: 90 TABLET | Refills: 0 | Status: SHIPPED | OUTPATIENT
Start: 2023-02-03 | End: 2023-05-14

## 2023-02-10 ENCOUNTER — OFFICE VISIT (OUTPATIENT)
Dept: AUDIOLOGY | Facility: CLINIC | Age: 63
End: 2023-02-10
Payer: COMMERCIAL

## 2023-02-10 DIAGNOSIS — H90.3 SENSORINEURAL HEARING LOSS, BILATERAL: Primary | ICD-10-CM

## 2023-02-10 PROCEDURE — 92550 TYMPANOMETRY & REFLEX THRESH: CPT | Performed by: AUDIOLOGIST

## 2023-02-10 PROCEDURE — 92592 PR HEARING AID CHECK, MONAURAL: CPT | Performed by: AUDIOLOGIST

## 2023-02-10 PROCEDURE — 92557 COMPREHENSIVE HEARING TEST: CPT | Performed by: AUDIOLOGIST

## 2023-02-10 NOTE — PROGRESS NOTES
AUDIOLOGY REPORT    SUBJECTIVE:  Steve Jin is a 62 year old male who was seen in the Audiology Clinic at the was seen in Audiology at the [facility], [facility] and [facility] for audiologic evaluation and hearing aid check. The patient has a documented asymmetric sensorineural hearing loss which is worse in the right ear after a sudden hearing loss in the 90's. He is currently using an OtSharelook Opn 1 S BiCROS system (serial number [id number]) which was fit at an outside clinic. He reports a possible decrease in hearing and continued bilateral tinnitus. Denies pain or dizziness. He reports things are okay with the hearing aids, however he still has continued issues with hearing in background noise. He also reports that at times speech seems muffled.    OBJECTIVE:  Otoscopic exam indicates ears are clear of cerumen bilaterally     Pure Tone Thresholds assessed using conventional audiometry with good  reliability from 250-8000 Hz bilaterally using insert earphones and circumaural headphones     RIGHT:  Severe to profound sensorineural hearing loss    LEFT:    Normal to moderate sensorineural hearing loss    Tympanogram:    RIGHT: normal eardrum mobility    LEFT:   normal eardrum mobility    Reflexes (reported by stimulus ear):  RIGHT: Ipsilateral is absent at frequencies tested  RIGHT: Contralateral is absent at frequencies tested  LEFT:   Ipsilateral is present at normal levels  LEFT:   Contralateral is present at normal levels      Speech Reception Threshold:    RIGHT: 85 dB HL     LEFT:   25 dB HL  Word Recognition Score:     RIGHT: 0% at 105 dB HL using NU-6 recorded word list.    LEFT:   100% at 65 dB HL using NU-6 recorded word list.    The hearing aids were cleaned and checked. We discussed that hearing in background noise as a one eared listener is always going to be more difficult. We discussed increasing the high frequency gain, however he did not want to do this as he already feels the  hearing aid is tinny. Looking at his gain settings however the high frequencies are higher in the third program so he is able to try that. In his speech in noise program the CROS input was turned down to help with noise coming from that side.       ASSESSMENT:   Hearing test and hearing aid check completed. Changes noted above.    PLAN:    Patient will return in one year for hearing test and hearing aid check, sooner per concern. Please contact this clinic with any questions or concerns.      Yane Rodriguez, [facility]  Licensed Audiologist  [place] License #[id number]

## 2023-02-20 ENCOUNTER — TELEPHONE (OUTPATIENT)
Dept: AUDIOLOGY | Facility: CLINIC | Age: 63
End: 2023-02-20
Payer: COMMERCIAL

## 2023-02-20 NOTE — TELEPHONE ENCOUNTER
LVM letting patient know that he can drop the hearing aid off at the 4th Floor Check-In desk and someone will take a look at it this week or he can schedule a Hearing Aid Check with his audiologist.    Jemima Pal, [facility]  Licensed Audiologist  [place] #[id number]    [facility] Call Center    Phone Message    May a detailed message be left on voicemail: yes     Reason for Call: Other: Pt called, hearing aid has a frayed wire by the earpiece and it is leading to a loss of  volume, please calll Thank You     Action Taken: Message routed to:  [facility] AUDIO    Travel Screening: Not Applicable

## 2023-03-01 ENCOUNTER — OFFICE VISIT (OUTPATIENT)
Dept: AUDIOLOGY | Facility: CLINIC | Age: 63
End: 2023-03-01
Payer: COMMERCIAL

## 2023-03-01 ENCOUNTER — TELEPHONE (OUTPATIENT)
Dept: AUDIOLOGY | Facility: CLINIC | Age: 63
End: 2023-03-01
Payer: COMMERCIAL

## 2023-03-01 DIAGNOSIS — H90.3 SENSORINEURAL HEARING LOSS, BILATERAL: Primary | ICD-10-CM

## 2023-03-02 NOTE — TELEPHONE ENCOUNTER
The patient dropped off his left hearing aid and right CROS transmitter at the clinic indicating the left  wire was broken.  The devices were cleaned and a new  was placed on the left hearing aid.  New domes were placed on both devices (same as when dropped off, left: 10mm open bass, right: 10mm bass double).  The patient will be billed $100.00 for the new .  He was contacted via email to  the hearing aids at his convenience.

## 2023-03-15 ENCOUNTER — OFFICE VISIT (OUTPATIENT)
Dept: FAMILY MEDICINE | Facility: CLINIC | Age: 63
End: 2023-03-15
Payer: COMMERCIAL

## 2023-03-15 VITALS
SYSTOLIC BLOOD PRESSURE: 112 MMHG | OXYGEN SATURATION: 97 % | HEART RATE: 54 BPM | TEMPERATURE: 98.4 F | RESPIRATION RATE: 16 BRPM | HEIGHT: 70 IN | BODY MASS INDEX: 22.78 KG/M2 | DIASTOLIC BLOOD PRESSURE: 68 MMHG | WEIGHT: 159.1 LBS

## 2023-03-15 DIAGNOSIS — E03.9 HYPOTHYROIDISM, UNSPECIFIED TYPE: ICD-10-CM

## 2023-03-15 DIAGNOSIS — Z00.00 ROUTINE GENERAL MEDICAL EXAMINATION AT A HEALTH CARE FACILITY: Primary | ICD-10-CM

## 2023-03-15 DIAGNOSIS — R20.2 PARESTHESIA: ICD-10-CM

## 2023-03-15 DIAGNOSIS — F40.243 FLYING PHOBIA: ICD-10-CM

## 2023-03-15 DIAGNOSIS — L98.9 SKIN LESION: ICD-10-CM

## 2023-03-15 DIAGNOSIS — E78.5 HYPERLIPIDEMIA LDL GOAL <70: ICD-10-CM

## 2023-03-15 DIAGNOSIS — G57.02 PIRIFORMIS SYNDROME, LEFT: ICD-10-CM

## 2023-03-15 DIAGNOSIS — R35.1 BENIGN PROSTATIC HYPERPLASIA WITH NOCTURIA: ICD-10-CM

## 2023-03-15 DIAGNOSIS — N40.1 BENIGN PROSTATIC HYPERPLASIA WITH NOCTURIA: ICD-10-CM

## 2023-03-15 DIAGNOSIS — Z12.5 SCREENING FOR PROSTATE CANCER: ICD-10-CM

## 2023-03-15 DIAGNOSIS — Z51.81 ENCOUNTER FOR THERAPEUTIC DRUG MONITORING: ICD-10-CM

## 2023-03-15 PROCEDURE — 99214 OFFICE O/P EST MOD 30 MIN: CPT | Mod: 25 | Performed by: FAMILY MEDICINE

## 2023-03-15 PROCEDURE — 99396 PREV VISIT EST AGE 40-64: CPT | Performed by: FAMILY MEDICINE

## 2023-03-15 RX ORDER — DIAZEPAM 5 MG
5 TABLET ORAL
Qty: 6 TABLET | Refills: 0 | Status: SHIPPED | OUTPATIENT
Start: 2023-03-15 | End: 2023-08-03

## 2023-03-15 RX ORDER — FINASTERIDE 5 MG/1
5 TABLET, FILM COATED ORAL DAILY
Qty: 90 TABLET | Refills: 3 | Status: SHIPPED | OUTPATIENT
Start: 2023-03-15 | End: 2023-04-14

## 2023-03-15 ASSESSMENT — ENCOUNTER SYMPTOMS
HEARTBURN: 0
MYALGIAS: 1
FEVER: 0
PALPITATIONS: 0
ABDOMINAL PAIN: 0
HEMATURIA: 0
NERVOUS/ANXIOUS: 0
SORE THROAT: 0
JOINT SWELLING: 0
FREQUENCY: 0
SHORTNESS OF BREATH: 0
EYE PAIN: 0
DYSURIA: 0
WEAKNESS: 0
DIZZINESS: 0
COUGH: 0
HEADACHES: 0
NAUSEA: 0
DIARRHEA: 0
CHILLS: 0
ARTHRALGIAS: 1
PARESTHESIAS: 1
CONSTIPATION: 0
HEMATOCHEZIA: 0

## 2023-03-15 ASSESSMENT — PAIN SCALES - GENERAL: PAINLEVEL: MILD PAIN (2)

## 2023-03-15 NOTE — PROGRESS NOTES
SUBJECTIVE:   CC: Steve is an 62 year old who presents for preventative health visit.   Patient has been advised of split billing requirements and indicates understanding: Yes  Healthy Habits:     Getting at least 3 servings of Calcium per day:  Yes    Bi-annual eye exam:  Yes    Dental care twice a year:  Yes    Sleep apnea or symptoms of sleep apnea:  None    Diet:  Regular (no restrictions)    Frequency of exercise:  6-7 days/week    Duration of exercise:  45-60 minutes    Taking medications regularly:  Yes    Medication side effects:  None    PHQ-2 Total Score: 0    Additional concerns today:  Yes    Over the last six months he has developed itchiness in the occipital area of the scalp. Sometimes it feels like an itch and other times wind blowing or tickling. He doesn't have rash or skin problems. He has tried different shampoos and even prescription medication. He has cervical stenosis and there is a condition which fits his symptoms. He was prescribed gabapentin at night which seems to help.      Symptoms present for more than one year. He is concerned about piriformis syndrome - he does have some back issues. When he sits down for a period of time he gets a pain in the left buttock and it feels that he is sitting a small ball. It creates a tightness in the hamstring and radiates pain to the knee cap. He doesn't feel like it is sciatica. It makes sitting or driving painful.     Enlarged prostate and causing him to get up in the evening. It is not painful. He would like to have an exam. He didn't like Flomax and gave him indigestion. He stopped medication after two weeks.     Today's PHQ-2 Score:   PHQ-2 ( 1999 Pfizer) 3/15/2023   Q1: Little interest or pleasure in doing things 0   Q2: Feeling down, depressed or hopeless 0   PHQ-2 Score 0   PHQ-2 Total Score (12-17 Years)- Positive if 3 or more points; Administer PHQ-A if positive -   Q1: Little interest or pleasure in doing things Not at all   Q2: Feeling  down, depressed or hopeless Not at all   PHQ-2 Score 0       Have you ever done Advance Care Planning? (For example, a Health Directive, POLST, or a discussion with a medical provider or your loved ones about your wishes): Yes, patient states has an Advance Care Planning document and will bring a copy to the clinic.    Social History     Tobacco Use     Smoking status: Former     Packs/day: 1.00     Years: 20.00     Pack years: 20.00     Types: Cigarettes     Smokeless tobacco: Never   Substance Use Topics     Alcohol use: Not Currently         Alcohol Use 3/15/2023   Prescreen: >3 drinks/day or >7 drinks/week? Not Applicable   No flowsheet data found.    Last PSA:   PSA   Date Value Ref Range Status   01/08/2021 0.7 0.0 - 4.5 ng/mL Final     PSA Tumor Marker   Date Value Ref Range Status   05/10/2022 0.63 0.00 - 4.50 ug/L Final       Reviewed orders with patient. Reviewed health maintenance and updated orders accordingly - Yes      Reviewed and updated as needed this visit by clinical staff   Tobacco  Allergies  Meds              Reviewed and updated as needed this visit by Provider                   Review of Systems   Constitutional: Negative for chills and fever.   HENT: Positive for hearing loss. Negative for congestion, ear pain and sore throat.    Eyes: Negative for pain and visual disturbance.   Respiratory: Negative for cough and shortness of breath.    Cardiovascular: Negative for chest pain, palpitations and peripheral edema.   Gastrointestinal: Negative for abdominal pain, constipation, diarrhea, heartburn, hematochezia and nausea.   Genitourinary: Negative for dysuria, frequency, genital sores, hematuria, impotence, penile discharge and urgency.   Musculoskeletal: Positive for arthralgias and myalgias. Negative for joint swelling.   Skin: Negative for rash.   Neurological: Positive for paresthesias. Negative for dizziness, weakness and headaches.   Psychiatric/Behavioral: Negative for mood changes.  "The patient is not nervous/anxious.          OBJECTIVE:   /68 (BP Location: Right arm, Patient Position: Sitting, Cuff Size: Adult Regular)   Pulse 54   Temp 98.4  F (36.9  C) (Temporal)   Resp 16   Ht 1.765 m (5' 9.5\")   Wt 72.2 kg (159 lb 1.6 oz)   SpO2 97%   BMI 23.16 kg/m      Physical Exam  GENERAL: healthy, alert and no distress  EYES: Eyes grossly normal to inspection,conjunctivae and sclerae normal  HENT: ear canals and TM's normal  NECK: no adenopathy, no asymmetry, masses, or scars and thyroid normal to palpation  RESP: lungs clear to auscultation - no rales, rhonchi or wheezes  CV: regular rate and rhythm, normal S1 S2, no S3 or S4, no murmur, click or rub, no peripheral edema and peripheral pulses strong  ABDOMEN: soft, nontender, no hepatosplenomegaly, no masses and bowel sounds normal  RECTAL:+ enlarged prostate normal size, smooth, nontender without nodules or masses  MS: no gross musculoskeletal defects noted, no edema  SKIN: skin colored papule near the right nostril   NEURO: Normal strength and tone, mentation intact and speech normal  PSYCH: mentation appears normal, affect normal/bright    Diagnostic Test Results:  Labs reviewed in Epic    ASSESSMENT/PLAN:     Routine general medical examination at a health care facility  Nov 2022 colonoscopy by REECE which was normal, repeat in 5. Steve will message me results through my chart.     Flying phobia  - [place] , no concerns   - diazepam (VALIUM) 5 MG tablet; Take 1 tablet (5 mg) by mouth once as needed for anxiety (take one hour prior to flight on airplane)  Dispense: 6 tablet; Refill: 0    Hypothyroidism, unspecified type  - refills not needed   - TSH with free T4 reflex; Future    Hyperlipidemia LDL goal <70  - refills not needed   - Lipid Profile (Chol, Trig, HDL, LDL calc); Future    Screening for prostate cancer  - PSA, screen; Future    Piriformis syndrome, left  - Spine  Referral; Future    Paresthesia  - unclear " etiology  - symptoms not c/w trigeminal neuralgia  - advised to discuss symptoms with dermatologist     Encounter for therapeutic drug monitoring  - CBC with platelets; Future  - Comprehensive metabolic panel (BMP + Alb, Alk Phos, ALT, AST, Total. Bili, TP); Future    Benign prostatic hyperplasia with nocturia  - not able to tolerate Flomax   - finasteride (PROSCAR) 5 MG tablet; Take 1 tablet (5 mg) by mouth daily  Dispense: 90 tablet; Refill: 3    Skin lesion  - likely fibrous papule however due to changing in size concern for basal cell carcinoma  - advised to schedule with dermatologist for further evaluation   - pt will call insurance company for in-network dermatologist           Patient has been advised of split billing requirements and indicates understanding: Yes      COUNSELING:   Reviewed preventive health counseling, as reflected in patient instructions        He reports that he has quit smoking. His smoking use included cigarettes. He has a 20.00 pack-year smoking history. He has never used smokeless tobacco.            Juan Jose Casillas MD  [facility]

## 2023-03-23 ENCOUNTER — LAB (OUTPATIENT)
Dept: LAB | Facility: CLINIC | Age: 63
End: 2023-03-23
Payer: COMMERCIAL

## 2023-03-23 DIAGNOSIS — Z51.81 ENCOUNTER FOR THERAPEUTIC DRUG MONITORING: ICD-10-CM

## 2023-03-23 DIAGNOSIS — Z12.5 SCREENING FOR PROSTATE CANCER: ICD-10-CM

## 2023-03-23 DIAGNOSIS — E78.5 HYPERLIPIDEMIA LDL GOAL <70: ICD-10-CM

## 2023-03-23 DIAGNOSIS — E03.9 HYPOTHYROIDISM, UNSPECIFIED TYPE: ICD-10-CM

## 2023-03-23 LAB
ALBUMIN SERPL BCG-MCNC: 4.2 G/DL (ref 3.5–5.2)
ALP SERPL-CCNC: 50 U/L (ref 40–129)
ALT SERPL W P-5'-P-CCNC: 24 U/L (ref 10–50)
ANION GAP SERPL CALCULATED.3IONS-SCNC: 9 MMOL/L (ref 7–15)
AST SERPL W P-5'-P-CCNC: 31 U/L (ref 10–50)
BILIRUB SERPL-MCNC: 0.6 MG/DL
BUN SERPL-MCNC: 15.3 MG/DL (ref 8–23)
CALCIUM SERPL-MCNC: 9 MG/DL (ref 8.8–10.2)
CHLORIDE SERPL-SCNC: 103 MMOL/L (ref 98–107)
CHOLEST SERPL-MCNC: 157 MG/DL
CREAT SERPL-MCNC: 0.85 MG/DL (ref 0.67–1.17)
DEPRECATED HCO3 PLAS-SCNC: 23 MMOL/L (ref 22–29)
ERYTHROCYTE [DISTWIDTH] IN BLOOD BY AUTOMATED COUNT: 12.2 % (ref 10–15)
GFR SERPL CREATININE-BSD FRML MDRD: >90 ML/MIN/1.73M2
GLUCOSE SERPL-MCNC: 89 MG/DL (ref 70–99)
HCT VFR BLD AUTO: 42.1 % (ref 40–53)
HDLC SERPL-MCNC: 70 MG/DL
HGB BLD-MCNC: 14.6 G/DL (ref 13.3–17.7)
LDLC SERPL CALC-MCNC: 73 MG/DL
MCH RBC QN AUTO: 32.4 PG (ref 26.5–33)
MCHC RBC AUTO-ENTMCNC: 34.7 G/DL (ref 31.5–36.5)
MCV RBC AUTO: 93 FL (ref 78–100)
NONHDLC SERPL-MCNC: 87 MG/DL
PLATELET # BLD AUTO: 153 10E3/UL (ref 150–450)
POTASSIUM SERPL-SCNC: 4.7 MMOL/L (ref 3.4–5.3)
PROT SERPL-MCNC: 6.7 G/DL (ref 6.4–8.3)
PSA SERPL DL<=0.01 NG/ML-MCNC: 0.45 NG/ML (ref 0–4.5)
RBC # BLD AUTO: 4.51 10E6/UL (ref 4.4–5.9)
SODIUM SERPL-SCNC: 135 MMOL/L (ref 136–145)
TRIGL SERPL-MCNC: 70 MG/DL
TSH SERPL DL<=0.005 MIU/L-ACNC: 2.02 UIU/ML (ref 0.3–4.2)
WBC # BLD AUTO: 5.8 10E3/UL (ref 4–11)

## 2023-03-23 PROCEDURE — 85027 COMPLETE CBC AUTOMATED: CPT

## 2023-03-23 PROCEDURE — 80053 COMPREHEN METABOLIC PANEL: CPT

## 2023-03-23 PROCEDURE — 84443 ASSAY THYROID STIM HORMONE: CPT

## 2023-03-23 PROCEDURE — G0103 PSA SCREENING: HCPCS

## 2023-03-23 PROCEDURE — 80061 LIPID PANEL: CPT

## 2023-03-23 PROCEDURE — 36415 COLL VENOUS BLD VENIPUNCTURE: CPT

## 2023-04-12 NOTE — PROGRESS NOTES
[facility] Dermatology Note  Encounter Date: Apr 14, 2023  Office Visit     Dermatology Problem List:  # Recently bx neoplasm of uncertain behavior on R nasolabial fold, suspected to be BCC, at [facility], bx results still in progress.  - Results requested  1. Seborrheic dermatitis.  - Ketoconazole shampoo  2. Eczema.  - TMC 0.1% ointment  3. AKs.  - History of Efudex use  4. Lesion to monitor, right upper chest, suspect inflammatory.    ____________________________________________    Assessment & Plan:    # Scalp pruritus, suspect some seborrheic dermatitis. Could also be neuropathic related to cervical issues  - Can try OTC dandruff shampoo or ketoconazole shampoo. Refills provided.     # Lesion to monitor, right upper chest, suspect inflammatory.  - Notify if does not resolve.     # Eczema, worse in winter, not active today.  - Advised daily moisturization with bland emollient.   - Continue TMC 0.1% ointment prn      # Benign lesions: solar lentigines, seborrheic keratoses, cherry angiomas. No treatment required.    # Multiple benign nevi.   - No concerning lesions today  - Monitor for ABCDEs of melanoma   - Continue sun protection - recommend SPF 30 or higher with frequent application   - Return sooner if noticing changing or symptomatic lesions    Procedures Performed:   None    Follow-up: 6 month(s) in-person, or earlier for new or changing lesions    Staff and Scribe:     Scribe Disclosure:  I, Jordan Babin, am serving as a scribe to document services personally performed by Alejandra Acevedo MD based on data collection and the provider's statements to me.     Provider Disclosure:   The documentation recorded by the scribe accurately reflects the services I personally performed and the decisions made by me.    Alejandra Acevedo MD    Department of Dermatology  [facility]: Phone: [phone],  Fax: [phone]  4/14/2023     ____________________________________________    CC: Skin Check (FBSE.)    HPI:  Mr. Steve Jin is a(n) 62 year old male who presents today as a return patient for FBSE. Last seen by me on 3/26/2021, at which time patient was started on ketoconazole shampoo for treatment of seborrheic dermatitis and also started on TMC ointment for treatment of eczema.     Today, patient reports some itchiness of the scalp and spots of concern throughout his body he would like examined. He reports regular sun protection. He also says there is a spot on his nose that bleeds sometimes.     Patient is otherwise feeling well, without additional skin concerns.    Labs Reviewed:  N/A    Physical Exam:  Vitals: There were no vitals taken for this visit.  SKIN: Total skin excluding the undergarment areas was performed. The exam included the head/face, neck, both arms, chest, back, abdomen, both legs, digits and/or nails.   - mild scaling occpital scalp.  - nonspecific erythematous macule right upper chest.   - Band-aid at right nasal labial fold at site of recent biopsy.   - There are dome shaped bright red papules on the trunk and extremities.   - Multiple regular brown pigmented macules and papules are identified on the trunk and extremities.   - Scattered brown macules on sun exposed areas.  - There are waxy stuck on tan to brown papules on the trunk and extremities.   - No other lesions of concern on areas examined.     Medications:  Current Outpatient Medications   Medication     atorvastatin (LIPITOR) 40 MG tablet     diazepam (VALIUM) 5 MG tablet     gabapentin (NEURONTIN) 100 MG capsule     ibuprofen (ADVIL/MOTRIN) 200 MG tablet     ketoconazole (NIZORAL) 2 % external shampoo     levothyroxine (SYNTHROID/LEVOTHROID) 112 MCG tablet     No current facility-administered medications for this visit.      Past Medical History:   Patient Active Problem List   Diagnosis     Hypothyroidism, unspecified  type     Hyperlipidemia LDL goal <70     CAD (coronary artery disease)     Tendinopathy of rotator cuff     Tear of right glenoid labrum     Syncope and collapse     Subjective tinnitus of right ear     Situational anxiety     Sensorineural hearing loss, asymmetrical     Sebaceous cyst     Primary osteoarthritis of left knee     Personal history of alcoholism (H)     Other and unspecified special symptom or syndrome, not elsewhere classified     Nasal septal deviation     Low bone mass     Hypertrophy of inferior nasal turbinate     Hearing loss in right ear     Environmental allergies     Closed fracture of left distal fibula     Chronic sinusitis     Biceps tendinopathy of right upper extremity     Backache     Pes cavus     Past Medical History:   Diagnosis Date     Actinic keratosis      Basal cell carcinoma      Coronary artery disease      Hepatitis C     S/p pegasys and rebatol tx in early 2000s     Hepatitis C      Hyperlipidemia      Hyperlipidemia LDL goal <70      Hypothyroid      Hypothyroidism      Osteopenia      RBBB      Sensorineural hearing loss, unilateral     Right ear     Situational anxiety     Flying     Syncope         CC No referring provider defined for this encounter. on close of this encounter.

## 2023-04-12 NOTE — PATIENT INSTRUCTIONS
Patient Education     Checking for Skin Cancer  You can find cancer early by checking your skin each month. There are 3 kinds of skin cancer. They are melanoma, basal cell carcinoma, and squamous cell carcinoma. Doing monthly skin checks is the best way to find new marks or skin changes. Follow the instructions below for checking your skin.   The ABCDEs of checking moles for melanoma   Check your moles or growths for signs of melanoma using ABCDE:   Asymmetry: the sides of the mole or growth don t match  Border: the edges are ragged, notched, or blurred  Color: the color within the mole or growth varies  Diameter: the mole or growth is larger than 6 mm (size of a pencil eraser)  Evolving: the size, shape, or color of the mole or growth is changing (evolving is not shown in the images below)    Checking for other types of skin cancer  Basal cell carcinoma or squamous cell carcinoma have symptoms such as:     A spot or mole that looks different from all other marks on your skin  Changes in how an area feels, such as itching, tenderness, or pain  Changes in the skin's surface, such as oozing, bleeding, or scaliness  A sore that does not heal  New swelling or redness beyond the border of a mole    Who s at risk?  Anyone can get skin cancer. But you are at greater risk if you have:   Fair skin, light-colored hair, or light-colored eyes  Many moles or abnormal moles on your skin  A history of sunburns from sunlight or tanning beds  A family history of skin cancer  A history of exposure to radiation or chemicals  A weakened immune system  If you have had skin cancer in the past, you are at risk for recurring skin cancer.   How to check your skin  Do your monthly skin checkups in front of a full-length mirror. Check all parts of your body, including your:   Head (ears, face, neck, and scalp)  Torso (front, back, and sides)  Arms (tops, undersides, upper, and lower armpits)  Hands (palms, backs, and fingers, including under  the nails)  Buttocks and genitals  Legs (front, back, and sides)  Feet (tops, soles, toes, including under the nails, and between toes)  If you have a lot of moles, take digital photos of them each month. Make sure to take photos both up close and from a distance. These can help you see if any moles change over time.   Most skin changes are not cancer. But if you see any changes in your skin, call your doctor right away. Only he or she can diagnose a problem. If you have skin cancer, seeing your doctor can be the first step toward getting the treatment that could save your life.   [email or web address] last reviewed this educational content on 4/1/2019 2000-2020 [employer]. [address] [place] [address], [place] [address]. All rights reserved. This information is not intended as a substitute for professional medical care. Always follow your healthcare professional's instructions.       When should I call my doctor?  If you are worsening or not improving, please, contact us or seek urgent care as noted below.     Who should I call with questions (adults)?  [facility] (adult and pediatric): [phone]  [facility] (adult): [phone]  For urgent needs outside of business hours call the [facility] at [phone] and ask for the dermatology resident on call to be paged  If this is a medical emergency and you are unable to reach an ER, Call [phone]    Who should I call with questions (pediatric)?  [facility]- Pediatric Dermatology  Dr. Bethany Pozo, Dr. Osmar Barrera, Dr. Diamond Mars, NARINDER Bella, Dr. Priscila Sal, Dr. Rashmi Bustos & Dr. Yvon Castano  Non-urgent nurse triage line; [phone]- Marina and Lela WRIGHT Care Coordinators   Mirian (/Complex ) [phone]    If you need a prescription refill, please contact your pharmacy. Refills are approved or denied by our Physicians  during normal business hours, Monday through Fridays  Per office policy, refills will not be granted if you have not been seen within the past year (or sooner depending on your child's condition)    Scheduling Information:  Pediatric Appointment Scheduling and Call Center [phone]  Radiology Scheduling- [phone]  Sedation Unit Scheduling- [phone]  [facility] Scheduling- General [phone]; Pediatric Dermatology [phone]  Main  Services: [phone]  Lebanese: [phone]  Costa Rican: [phone]  Hmong/Tacho/Keagan: [phone]  Preadmission Nursing Department Fax Number: [phone] (Fax all pre-operative paperwork to this number)    For urgent matters arising during evenings, weekends, or holidays that cannot wait for normal business hours please call [phone] and ask for the dermatology resident on call to be paged.

## 2023-04-13 ENCOUNTER — TRANSFERRED RECORDS (OUTPATIENT)
Dept: HEALTH INFORMATION MANAGEMENT | Facility: CLINIC | Age: 63
End: 2023-04-13
Payer: COMMERCIAL

## 2023-04-14 ENCOUNTER — OFFICE VISIT (OUTPATIENT)
Dept: DERMATOLOGY | Facility: CLINIC | Age: 63
End: 2023-04-14
Payer: COMMERCIAL

## 2023-04-14 DIAGNOSIS — D22.9 MULTIPLE BENIGN NEVI: ICD-10-CM

## 2023-04-14 DIAGNOSIS — D18.01 CHERRY ANGIOMA: ICD-10-CM

## 2023-04-14 DIAGNOSIS — L81.4 SOLAR LENTIGO: Primary | ICD-10-CM

## 2023-04-14 DIAGNOSIS — L21.9 DERMATITIS, SEBORRHEIC: ICD-10-CM

## 2023-04-14 DIAGNOSIS — L82.1 SEBORRHEIC KERATOSIS: ICD-10-CM

## 2023-04-14 DIAGNOSIS — L30.9 ECZEMA, UNSPECIFIED TYPE: ICD-10-CM

## 2023-04-14 PROCEDURE — 99213 OFFICE O/P EST LOW 20 MIN: CPT | Performed by: DERMATOLOGY

## 2023-04-14 RX ORDER — KETOCONAZOLE 20 MG/ML
SHAMPOO TOPICAL
Qty: 120 ML | Refills: 11 | Status: SHIPPED | OUTPATIENT
Start: 2023-04-14

## 2023-04-14 NOTE — LETTER
4/14/2023       RE: Steve Jin  [address]  [place] [address]     Dear Colleague,    Thank you for referring your patient, Steve iJn, to the [facility]MATOLOG[facility] [place] at [facility]. Please see a copy of my visit note below.    [facility] Dermatology Note  Encounter Date: Apr 14, 2023  Office Visit     Dermatology Problem List:  # Recently bx neoplasm of uncertain behavior on R nasolabial fold, suspected to be BCC, at derm [facility], bx results still in progress.  - Results requested  1. Seborrheic dermatitis.  - Ketoconazole shampoo  2. Eczema.  - TMC 0.1% ointment  3. AKs.  - History of Efudex use  4. Lesion to monitor, right upper chest, suspect inflammatory.    ____________________________________________    Assessment & Plan:    # Scalp pruritus, suspect some seborrheic dermatitis. Could also be neuropathic related to cervical issues  - Can try OTC dandruff shampoo or ketoconazole shampoo. Refills provided.     # Lesion to monitor, right upper chest, suspect inflammatory.  - Notify if does not resolve.     # Eczema, worse in winter, not active today.  - Advised daily moisturization with bland emollient.   - Continue TMC 0.1% ointment prn      # Benign lesions: solar lentigines, seborrheic keratoses, cherry angiomas. No treatment required.    # Multiple benign nevi.   - No concerning lesions today  - Monitor for ABCDEs of melanoma   - Continue sun protection - recommend SPF 30 or higher with frequent application   - Return sooner if noticing changing or symptomatic lesions    Procedures Performed:   None    Follow-up: 6 month(s) in-person, or earlier for new or changing lesions    Staff and Scribe:     Scribe Disclosure:  Jordan BURNS, am serving as a scribe to document services personally performed by Alejandra Acevedo MD based on data collection and the provider's statements to me.     Provider  Disclosure:   The documentation recorded by the scribe accurately reflects the services I personally performed and the decisions made by me.    Alejandra Acevedo MD    Department of Dermatology  [facility]: Phone: [phone], Fax: [phone]  4/14/2023     ____________________________________________    CC: Skin Check (FBSE.)    HPI:  Mr. Steve Jin is a(n) 62 year old male who presents today as a return patient for FBSE. Last seen by me on 3/26/2021, at which time patient was started on ketoconazole shampoo for treatment of seborrheic dermatitis and also started on TMC ointment for treatment of eczema.     Today, patient reports some itchiness of the scalp and spots of concern throughout his body he would like examined. He reports regular sun protection. He also says there is a spot on his nose that bleeds sometimes.     Patient is otherwise feeling well, without additional skin concerns.    Labs Reviewed:  N/A    Physical Exam:  Vitals: There were no vitals taken for this visit.  SKIN: Total skin excluding the undergarment areas was performed. The exam included the head/face, neck, both arms, chest, back, abdomen, both legs, digits and/or nails.   - mild scaling occpital scalp.  - nonspecific erythematous macule right upper chest.   - Band-aid at right nasal labial fold at site of recent biopsy.   - There are dome shaped bright red papules on the trunk and extremities.   - Multiple regular brown pigmented macules and papules are identified on the trunk and extremities.   - Scattered brown macules on sun exposed areas.  - There are waxy stuck on tan to brown papules on the trunk and extremities.   - No other lesions of concern on areas examined.     Medications:  Current Outpatient Medications   Medication    atorvastatin (LIPITOR) 40 MG tablet    diazepam (VALIUM) 5 MG tablet    gabapentin (NEURONTIN) 100 MG  capsule    ibuprofen (ADVIL/MOTRIN) 200 MG tablet    ketoconazole (NIZORAL) 2 % external shampoo    levothyroxine (SYNTHROID/LEVOTHROID) 112 MCG tablet     No current facility-administered medications for this visit.      Past Medical History:   Patient Active Problem List   Diagnosis    Hypothyroidism, unspecified type    Hyperlipidemia LDL goal <70    CAD (coronary artery disease)    Tendinopathy of rotator cuff    Tear of right glenoid labrum    Syncope and collapse    Subjective tinnitus of right ear    Situational anxiety    Sensorineural hearing loss, asymmetrical    Sebaceous cyst    Primary osteoarthritis of left knee    Personal history of alcoholism (H)    Other and unspecified special symptom or syndrome, not elsewhere classified    Nasal septal deviation    Low bone mass    Hypertrophy of inferior nasal turbinate    Hearing loss in right ear    Environmental allergies    Closed fracture of left distal fibula    Chronic sinusitis    Biceps tendinopathy of right upper extremity    Backache    Pes cavus     Past Medical History:   Diagnosis Date    Actinic keratosis     Basal cell carcinoma     Coronary artery disease     Hepatitis C     S/p pegasys and rebatol tx in early 2000s    Hepatitis C     Hyperlipidemia     Hyperlipidemia LDL goal <70     Hypothyroid     Hypothyroidism     Osteopenia     RBBB     Sensorineural hearing loss, unilateral     Right ear    Situational anxiety     Flying    Syncope         CC No referring provider defined for this encounter. on close of this encounter.

## 2023-04-14 NOTE — NURSING NOTE
Dermatology Rooming Note    Steve Jin's goals for this visit include:   Chief Complaint   Patient presents with     Skin Check     FBSE.     Kp Perkins, EMT-B

## 2023-05-10 DIAGNOSIS — E03.9 HYPOTHYROIDISM, UNSPECIFIED TYPE: ICD-10-CM

## 2023-05-14 RX ORDER — LEVOTHYROXINE SODIUM 112 UG/1
TABLET ORAL
Qty: 90 TABLET | Refills: 0 | Status: SHIPPED | OUTPATIENT
Start: 2023-05-14 | End: 2023-07-27

## 2023-05-22 ENCOUNTER — TRANSFERRED RECORDS (OUTPATIENT)
Dept: HEALTH INFORMATION MANAGEMENT | Facility: CLINIC | Age: 63
End: 2023-05-22
Payer: COMMERCIAL

## 2023-06-13 DIAGNOSIS — M54.17 LUMBOSACRAL RADICULOPATHY AT L5: ICD-10-CM

## 2023-06-13 RX ORDER — GABAPENTIN 100 MG/1
100 CAPSULE ORAL 3 TIMES DAILY
Qty: 90 CAPSULE | Refills: 3 | Status: SHIPPED | OUTPATIENT
Start: 2023-06-13

## 2023-07-02 ENCOUNTER — E-VISIT (OUTPATIENT)
Dept: FAMILY MEDICINE | Facility: CLINIC | Age: 63
End: 2023-07-02
Payer: COMMERCIAL
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
## 2023-07-02 PROCEDURE — 99207 PR NON-BILLABLE SERV PER CHARTING: CPT | Performed by: FAMILY MEDICINE

## 2023-07-03 RX ORDER — DOXYCYCLINE 100 MG/1
200 CAPSULE ORAL ONCE
Qty: 2 CAPSULE | Refills: 0 | Status: SHIPPED | OUTPATIENT
Start: 2023-07-03 | End: 2023-07-03

## 2023-08-12 DIAGNOSIS — E78.5 HYPERLIPIDEMIA LDL GOAL <70: ICD-10-CM

## 2023-08-14 RX ORDER — ATORVASTATIN CALCIUM 40 MG/1
40 TABLET, FILM COATED ORAL DAILY
Qty: 90 TABLET | Refills: 1 | Status: SHIPPED | OUTPATIENT
Start: 2023-08-14

## 2023-09-26 DIAGNOSIS — E03.9 HYPOTHYROIDISM, UNSPECIFIED TYPE: ICD-10-CM

## 2023-09-27 RX ORDER — LEVOTHYROXINE SODIUM 112 UG/1
112 TABLET ORAL DAILY
Qty: 90 TABLET | Refills: 0 | Status: SHIPPED | OUTPATIENT
Start: 2023-09-27

## 2023-11-07 ENCOUNTER — TRANSFERRED RECORDS (OUTPATIENT)
Dept: HEALTH INFORMATION MANAGEMENT | Facility: CLINIC | Age: 63
End: 2023-11-07
Payer: COMMERCIAL

## 2023-12-21 DIAGNOSIS — M25.551 BILATERAL HIP PAIN: Primary | ICD-10-CM

## 2023-12-21 DIAGNOSIS — M25.552 BILATERAL HIP PAIN: Primary | ICD-10-CM

## 2023-12-24 NOTE — TELEPHONE ENCOUNTER
DIAGNOSIS: BILATERAL hip pain / SELF / [employer] / Imaging ON FILE from 12.13.21     APPOINTMENT DATE: 12.28.23   NOTES STATUS DETAILS   OFFICE NOTE from referring provider Internal 3.15.23  Vinny ESTRADA   OFFICE NOTE from other specialist Internal 12.7.21, 11.23.21  Jean Pierre  PT   MEDICATION LIST Internal    DEXA (osteoporosis/bone health) Internal 12.13.21   XRAYS (IMAGES & REPORTS) In process/Internal *sched* for 12.28.23  XR Hip Gopi  XR Lumbar Spine    12.3.21  XR Hip Right

## 2023-12-28 ENCOUNTER — OFFICE VISIT (OUTPATIENT)
Dept: ORTHOPEDICS | Facility: CLINIC | Age: 63
End: 2023-12-28
Payer: COMMERCIAL

## 2023-12-28 ENCOUNTER — PRE VISIT (OUTPATIENT)
Dept: ORTHOPEDICS | Facility: CLINIC | Age: 63
End: 2023-12-28

## 2023-12-28 ENCOUNTER — ANCILLARY PROCEDURE (OUTPATIENT)
Dept: GENERAL RADIOLOGY | Facility: CLINIC | Age: 63
End: 2023-12-28
Attending: PREVENTIVE MEDICINE
Payer: COMMERCIAL

## 2023-12-28 VITALS — HEIGHT: 70 IN | WEIGHT: 159 LBS | BODY MASS INDEX: 22.76 KG/M2

## 2023-12-28 DIAGNOSIS — M25.551 BILATERAL HIP PAIN: ICD-10-CM

## 2023-12-28 DIAGNOSIS — M51.16 LUMBAR DISC HERNIATION WITH RADICULOPATHY: ICD-10-CM

## 2023-12-28 DIAGNOSIS — M43.16 ANTEROLISTHESIS OF LUMBAR SPINE: ICD-10-CM

## 2023-12-28 DIAGNOSIS — M25.552 BILATERAL HIP PAIN: ICD-10-CM

## 2023-12-28 DIAGNOSIS — M54.16 LUMBAR RADICULAR PAIN: Primary | ICD-10-CM

## 2023-12-28 PROCEDURE — 72110 X-RAY EXAM L-2 SPINE 4/>VWS: CPT | Performed by: STUDENT IN AN ORGANIZED HEALTH CARE EDUCATION/TRAINING PROGRAM

## 2023-12-28 PROCEDURE — 73522 X-RAY EXAM HIPS BI 3-4 VIEWS: CPT | Performed by: RADIOLOGY

## 2023-12-28 PROCEDURE — 99204 OFFICE O/P NEW MOD 45 MIN: CPT | Performed by: PREVENTIVE MEDICINE

## 2023-12-28 RX ORDER — METHYLPREDNISOLONE 4 MG
TABLET, DOSE PACK ORAL
Qty: 21 TABLET | Refills: 0 | Status: SHIPPED | OUTPATIENT
Start: 2023-12-28

## 2023-12-28 RX ORDER — ETODOLAC 500 MG
500 TABLET ORAL 2 TIMES DAILY PRN
Qty: 60 TABLET | Refills: 0 | Status: SHIPPED | OUTPATIENT
Start: 2023-12-28

## 2023-12-28 NOTE — PROGRESS NOTES
HISTORY OF PRESENT ILLNESS  Mr. Jin is a pleasant 63 year old year old male who presents to clinic today with the following:    What problem are you here for: Evaluation of his bilateral hips and low back. He reports he will have posterior left hip pain that will radiate into his left knee. He mentions pain is worse when he sits for an extended period of time. He noticed over the last year he is having more difficulty with his daily exercise.  He reports tingling, tingling and burning sensation that will radiate into his left knee.  Daily exercise include daily walking, cycling, cross country skiing and kayaking.   He reports Anterior right hip pain intermittently.     How long have you had this problem: 1 year    Have you had any recent imaging of this problem? Xrays/MRI/CT scans:  - XR of bilateral hips and lumbar spine taken at appointment today.     Have you had treatments for this problem in the past?  -Medications: gabapentin prn and Advil prn  -Physical therapy: he mentions that he has been to physical therapy for his low back 2 years ago.   -Injections: None  -Surgery: None, bilateral knee surgeries over the last 20 years.   - Chiropractic care.     How severe is this problem today? 0-10 scale: 1/10    What do you think caused this problem: No acute injury     Does this problem or its symptoms cause difficulty for you falling asleep or staying asleep: No     Anything else you want us to know about this problem:  - He had a femur fracture when he was 17 years old that required a hunter to be placed. He states hardware is removed.           MEDICAL HISTORY  Patient Active Problem List   Diagnosis    Hypothyroidism, unspecified type    Hyperlipidemia LDL goal <70    CAD (coronary artery disease)    Tendinopathy of rotator cuff    Tear of right glenoid labrum    Syncope and collapse    Subjective tinnitus of right ear    Situational anxiety    Sensorineural hearing loss, asymmetrical    Sebaceous cyst    Primary  osteoarthritis of left knee    Personal history of alcoholism (H)    Other and unspecified special symptom or syndrome, not elsewhere classified    Nasal septal deviation    Low bone mass    Hypertrophy of inferior nasal turbinate    Hearing loss in right ear    Environmental allergies    Closed fracture of left distal fibula    Chronic sinusitis    Biceps tendinopathy of right upper extremity    Backache    Pes cavus       Current Outpatient Medications   Medication Sig Dispense Refill    gabapentin (NEURONTIN) 100 MG capsule Take 1 capsule (100 mg) by mouth 3 times daily 90 capsule 3    atorvastatin (LIPITOR) 40 MG tablet TAKE 1 TABLET BY MOUTH EVERY DAY 90 tablet 1    diazepam (VALIUM) 5 MG tablet Take 1 tablet (5 mg) by mouth once as needed for anxiety (take one hour prior to flight on airplane) 6 tablet 0    ibuprofen (ADVIL/MOTRIN) 200 MG tablet Take 200-400 mg by mouth       ketoconazole (NIZORAL) 2 % external shampoo Massage into wet scalp, let sit 3-5 min, then rinse. Do this 3x weekly. 120 mL 11    levothyroxine (SYNTHROID/LEVOTHROID) 112 MCG tablet TAKE 1 TABLET(112 MCG) BY MOUTH DAILY 90 tablet 0       Allergies   Allergen Reactions    Sulfa Antibiotics Rash    Sulfanilamide Rash     Doctor believes he was allergic to topical ointment as a teenager ?sulfa containing       Hydrocodone-Acetaminophen Other (See Comments)    No Clinical Screening - See Comments      PN: RUDY CM1: CONTRAST- nka Reaction :       Family History   Problem Relation Age of Onset    Hypothyroidism Sister     Cancer Sister     Hypothyroidism Brother     Cerebrovascular Disease No family hx of     Diabetes No family hx of     Colon Cancer No family hx of     Heart Disease No family hx of     Hypertension No family hx of     Valvular heart disease Mother     Sudden Death Brother      Social History     Socioeconomic History    Marital status:    Tobacco Use    Smoking status: Former     Packs/day: 1.00     Years: 20.00      "Additional pack years: 0.00     Total pack years: 20.00     Types: Cigarettes     Quit date:      Years since quittin.0    Smokeless tobacco: Never   Vaping Use    Vaping Use: Never used   Substance and Sexual Activity    Alcohol use: Not Currently    Drug use: Not Currently       Additional medical/Social/Surgical histories reviewed in [facility] and updated as appropriate.     REVIEW OF SYSTEMS (2023)  10 point ROS of systems including Constitutional, Eyes, Respiratory, Cardiovascular, Gastroenterology, Genitourinary, Integumentary, Musculoskeletal, Psychiatric, Allergic/Immunologic were all negative except for pertinent positives noted in my HPI.     PHYSICAL EXAM  VSS  Vital Signs: Ht 1.765 m (5' 9.5\")   Wt 72.1 kg (159 lb)   BMI 23.14 kg/m   Patient declined being weighed. Body mass index is 23.14 kg/m .    General  - normal appearance, in no obvious distress  HEENT  - conjunctivae not injected, moist mucous membranes, normocephalic/atraumatic head, ears normal appearance, no lesions, mouth normal appearance, no scars, normal dentition and teeth present  CV  - normal peripheral perfusion  Pulm  - normal respiratory pattern, non-labored  Musculoskeletal - lumbar spine  - stance: normal gait without limp, no obvious leg length discrepancy, normal heel and toe walk  - inspection: normal bone and joint alignment, no obvious scoliosis  - palpation: no paravertebral or bony tenderness  - ROM: flexion exacerbates pain, normal extension, sidebending, rotation  - strength: lower extremities 5/5 in all planes  - special tests:  (+) straight leg raise  (+) slump test  Neuro  - patellar and Achilles DTRs 2+ bilaterally, lower extremity sensory deficit throughout L5 distribution, grossly normal coordination, normal muscle tone  Skin  - no ecchymosis, erythema, warmth, or induration, no obvious rash  Psych  - interactive, appropriate, normal mood and affect    ASSESSMENT & PLAN  64 yo male with lumbar ddd, disc " herniations, radicular pain    I independently reviewed the following imaging studies:  Lumbar xray: shows ddd  Discussed and ordered lumbar MRI  RX given for lodine and medrol pack  Given HEP  Consider more PT  Followup after MRI  Patient has been doing home exercise physical therapy program for this problem      Appropriate PPE was utilized for prevention of spread of Covid-19.  Yvon Montemayor MD, CAQSM

## 2023-12-28 NOTE — LETTER
12/28/2023      RE: Steve Jin  [address]  [place] [address]     Dear Colleague,    Thank you for referring your patient, Steve Jin, to the [facility] SP[facility]ICINE [facility]IN[facility] [place]. Please see a copy of my visit note below.    HISTORY OF PRESENT ILLNESS  Mr. Jin is a pleasant 63 year old year old male who presents to clinic today with the following:    What problem are you here for: Evaluation of his bilateral hips and low back. He reports he will have posterior left hip pain that will radiate into his left knee. He mentions pain is worse when he sits for an extended period of time. He noticed over the last year he is having more difficulty with his daily exercise.  He reports tingling, tingling and burning sensation that will radiate into his left knee.  Daily exercise include daily walking, cycling, cross country skiing and kayaking.   He reports Anterior right hip pain intermittently.     How long have you had this problem: 1 year    Have you had any recent imaging of this problem? Xrays/MRI/CT scans:  - XR of bilateral hips and lumbar spine taken at appointment today.     Have you had treatments for this problem in the past?  -Medications: gabapentin prn and Advil prn  -Physical therapy: he mentions that he has been to physical therapy for his low back 2 years ago.   -Injections: None  -Surgery: None, bilateral knee surgeries over the last 20 years.   - Chiropractic care.     How severe is this problem today? 0-10 scale: 1/10    What do you think caused this problem: No acute injury     Does this problem or its symptoms cause difficulty for you falling asleep or staying asleep: No     Anything else you want us to know about this problem:  - He had a femur fracture when he was 17 years old that required a hunter to be placed. He states hardware is removed.           MEDICAL HISTORY  Patient Active Problem List   Diagnosis    Hypothyroidism, unspecified type     Hyperlipidemia LDL goal <70    CAD (coronary artery disease)    Tendinopathy of rotator cuff    Tear of right glenoid labrum    Syncope and collapse    Subjective tinnitus of right ear    Situational anxiety    Sensorineural hearing loss, asymmetrical    Sebaceous cyst    Primary osteoarthritis of left knee    Personal history of alcoholism (H)    Other and unspecified special symptom or syndrome, not elsewhere classified    Nasal septal deviation    Low bone mass    Hypertrophy of inferior nasal turbinate    Hearing loss in right ear    Environmental allergies    Closed fracture of left distal fibula    Chronic sinusitis    Biceps tendinopathy of right upper extremity    Backache    Pes cavus       Current Outpatient Medications   Medication Sig Dispense Refill    gabapentin (NEURONTIN) 100 MG capsule Take 1 capsule (100 mg) by mouth 3 times daily 90 capsule 3    atorvastatin (LIPITOR) 40 MG tablet TAKE 1 TABLET BY MOUTH EVERY DAY 90 tablet 1    diazepam (VALIUM) 5 MG tablet Take 1 tablet (5 mg) by mouth once as needed for anxiety (take one hour prior to flight on airplane) 6 tablet 0    ibuprofen (ADVIL/MOTRIN) 200 MG tablet Take 200-400 mg by mouth       ketoconazole (NIZORAL) 2 % external shampoo Massage into wet scalp, let sit 3-5 min, then rinse. Do this 3x weekly. 120 mL 11    levothyroxine (SYNTHROID/LEVOTHROID) 112 MCG tablet TAKE 1 TABLET(112 MCG) BY MOUTH DAILY 90 tablet 0       Allergies   Allergen Reactions    Sulfa Antibiotics Rash    Sulfanilamide Rash     Doctor believes he was allergic to topical ointment as a teenager ?sulfa containing       Hydrocodone-Acetaminophen Other (See Comments)    No Clinical Screening - See Comments      PN: LW CM1: CONTRAST- nka Reaction :       Family History   Problem Relation Age of Onset    Hypothyroidism Sister     Cancer Sister     Hypothyroidism Brother     Cerebrovascular Disease No family hx of     Diabetes No family hx of     Colon Cancer No family hx of      "Heart Disease No family hx of     Hypertension No family hx of     Valvular heart disease Mother     Sudden Death Brother      Social History     Socioeconomic History    Marital status:    Tobacco Use    Smoking status: Former     Packs/day: 1.00     Years: 20.00     Additional pack years: 0.00     Total pack years: 20.00     Types: Cigarettes     Quit date:      Years since quittin.0    Smokeless tobacco: Never   Vaping Use    Vaping Use: Never used   Substance and Sexual Activity    Alcohol use: Not Currently    Drug use: Not Currently       Additional medical/Social/Surgical histories reviewed in [facility] and updated as appropriate.     REVIEW OF SYSTEMS (2023)  10 point ROS of systems including Constitutional, Eyes, Respiratory, Cardiovascular, Gastroenterology, Genitourinary, Integumentary, Musculoskeletal, Psychiatric, Allergic/Immunologic were all negative except for pertinent positives noted in my HPI.     PHYSICAL EXAM  VSS  Vital Signs: Ht 1.765 m (5' 9.5\")   Wt 72.1 kg (159 lb)   BMI 23.14 kg/m   Patient declined being weighed. Body mass index is 23.14 kg/m .    General  - normal appearance, in no obvious distress  HEENT  - conjunctivae not injected, moist mucous membranes, normocephalic/atraumatic head, ears normal appearance, no lesions, mouth normal appearance, no scars, normal dentition and teeth present  CV  - normal peripheral perfusion  Pulm  - normal respiratory pattern, non-labored  Musculoskeletal - lumbar spine  - stance: normal gait without limp, no obvious leg length discrepancy, normal heel and toe walk  - inspection: normal bone and joint alignment, no obvious scoliosis  - palpation: no paravertebral or bony tenderness  - ROM: flexion exacerbates pain, normal extension, sidebending, rotation  - strength: lower extremities 5/5 in all planes  - special tests:  (+) straight leg raise  (+) slump test  Neuro  - patellar and Achilles DTRs 2+ bilaterally, lower extremity " sensory deficit throughout L5 distribution, grossly normal coordination, normal muscle tone  Skin  - no ecchymosis, erythema, warmth, or induration, no obvious rash  Psych  - interactive, appropriate, normal mood and affect    ASSESSMENT & PLAN  62 yo male with lumbar ddd, disc herniations, radicular pain    I independently reviewed the following imaging studies:  Lumbar xray: shows ddd  Discussed and ordered lumbar MRI  RX given for lodine and medrol pack  Given HEP  Consider more PT  Followup after MRI  Patient has been doing home exercise physical therapy program for this problem      Appropriate PPE was utilized for prevention of spread of Covid-19.  Yvon Montemayor MD, CAQSM      Again, thank you for allowing me to participate in the care of your patient.      Sincerely,    Yvon Montemayor MD

## 2023-12-28 NOTE — PATIENT INSTRUCTIONS
Low Back Pain Exercises    EXERCISES  These exercises are intended only as suggestions. Be sure to check with your provider before starting the exercises.    Standing hamstring stretch: Put the heel of one leg on a stool about 15 inches high. Keep your leg straight. Lean forward, bending at the hips until you feel a mild stretch in the back of your thigh. Make sure you do not roll your shoulders or bend at the waist when doing this. You want to stretch your leg, not your lower back. Hold the stretch for 15 to 30 seconds. Repeat with each leg 3 times.    Cat and camel: Get down on your hands and knees. Let your stomach sag, allowing your back to curve downward. Hold this position for 5 seconds. Then arch your back and hold for 5 seconds. Do 2 sets of 15.    Quadruped arm and leg raise: Get down on your hands and knees. Pull in your belly button and tighten your abdominal muscles to stiffen your spine. While keeping your abdominals tight, raise one arm and the opposite leg away from you. Hold this position for 5 seconds. Lower your arm and leg slowly and change sides. Do this 10 times on each side.    Pelvic tilt: Lie on your back with your knees bent and your feet flat on the floor. Pull your belly button in towards your spine and push your lower back into the floor, flattening your back. Hold this position for 15 seconds, then relax. Repeat 5 to 10 times.    Partial curl: Lie on your back with your knees bent and your feet flat on the floor. Draw in your abdomen and tighten your stomach muscles. With your hands stretched out in front of you, curl your upper body forward until your shoulders clear the floor. Hold this position for 3 seconds. Don't hold your breath. It helps to breathe out as you lift your shoulders. Relax back to the floor. Repeat 10 times. Build to 2 sets of 15. To challenge yourself, clasp your hands behind your head and keep your elbows out to your sides.    Gluteal stretch: Lie on your back with  both knees bent. Rest your right ankle over the knee of your left leg. Grasp the thigh of the left leg and pull toward your chest. You will feel a stretch along the buttocks and possibly along the outside of your hip. Hold the stretch for 15 to 30 seconds. Then repeat the exercise with your left ankle over your right knee. Do the exercise 3 times with each leg.    Extension exercise  Lie face down on the floor for 5 minutes. If this hurts too much, lie face down with a pillow under your stomach. This should relieve your leg or back pain. When you can lie on your stomach for 5 minutes without a pillow, you can continue with Part B of this exercise.    After lying on your stomach for 5 minutes, prop yourself up on your elbows for another 5 minutes. If you can do this without having more leg or buttock pain, you can start doing part C of this exercise.    Lie on your stomach with your hands under your shoulders. Then press down on your hands and extend your elbows while keeping your hips flat on the floor. Hold for 1 second and lower yourself to the floor. Do 3 to 5 sets of 10 repetitions. Rest for 1 minute between sets. You should have no pain in your legs when you do this, but it is normal to feel some pain in your lower back.    Do this exercise several times a day.    Side plank: Lie on your side with your legs, hips, and shoulders in a straight line. Prop yourself up onto your forearm with your elbow directly under your shoulder. Lift your hips off the floor and balance on your forearm and the outside of your foot. Try to hold this position for 15 seconds and then slowly lower your hip to the ground. Switch sides and repeat. Work up to holding for 1 minute. This exercise can be made easier by starting with your knees and hips flexed toward your chest.    EXERCISES TO AVOID     It s best to avoid the following exercises because they strain the lower back:  Exercises in which you lie on your back and raise and lower  both legs together  Full sit-ups or sit-ups with straight legs  Hip twists    SPORTS AND OTHER ACTIVITIES    In addition to strengthening your back muscles, it s helpful to keep your entire body in shape. Good activities for people with back problems include:    Walking  Bicycling  Swimming  Cross-country skiing  Yoga  [employer]  Pilates    Some sports can hurt your back because of rough contact, twisting, sudden impact, or direct stress on your back. Sports that may be dangerous to your back include:    Football  Soccer  Volleyball  Handball  Golf  Weight lifting  Trampoline  Tobogganing  Sledding  Snowmobiling  Snowboarding  Ice hockey

## 2024-01-15 ENCOUNTER — HOSPITAL ENCOUNTER (OUTPATIENT)
Dept: MRI IMAGING | Facility: HOSPITAL | Age: 64
Discharge: HOME | End: 2024-01-15
Attending: PREVENTIVE MEDICINE | Admitting: PREVENTIVE MEDICINE
Payer: COMMERCIAL

## 2024-01-15 DIAGNOSIS — M51.16 LUMBAR DISC HERNIATION WITH RADICULOPATHY: ICD-10-CM

## 2024-01-15 DIAGNOSIS — M54.16 LUMBAR RADICULAR PAIN: ICD-10-CM

## 2024-01-15 PROCEDURE — 72148 MRI LUMBAR SPINE W/O DYE: CPT

## 2024-01-18 ENCOUNTER — VIRTUAL VISIT (OUTPATIENT)
Dept: ORTHOPEDICS | Facility: CLINIC | Age: 64
End: 2024-01-18
Payer: COMMERCIAL

## 2024-01-18 DIAGNOSIS — M43.16 ANTEROLISTHESIS OF LUMBAR SPINE: ICD-10-CM

## 2024-01-18 DIAGNOSIS — M51.16 LUMBAR DISC HERNIATION WITH RADICULOPATHY: Primary | ICD-10-CM

## 2024-01-18 PROCEDURE — 99442 PR PHYSICIAN TELEPHONE EVALUATION 11-20 MIN: CPT | Mod: 93 | Performed by: PREVENTIVE MEDICINE

## 2024-01-18 RX ORDER — METHYLPREDNISOLONE 4 MG
TABLET, DOSE PACK ORAL
Qty: 21 TABLET | Refills: 0 | Status: SHIPPED | OUTPATIENT
Start: 2024-01-18

## 2024-01-18 NOTE — PROGRESS NOTES
Patient is a   63  year old who is being evaluated via a billable telephone visit.      What phone number would you like to be contacted at? CELL  How would you like to obtain your AVS? NAOMI        Subjective   Patient is a   63  year old who presents by phone call visit for the following:     HPI   Followup for lumbar MRI  Continues to have low back pain and radiation into legs  Wants to discuss MRI    Review of Systems   Constitutional, HEENT, cardiovascular, pulmonary, gi and gu systems are negative, except as otherwise noted.      Objective           Vitals:  No vitals were obtained today due to virtual visit.    Physical Exam   healthy, alert, and no distress  PSYCH: Alert and oriented times 3; coherent speech, normal   rate and volume, able to articulate logical thoughts, able   to abstract reason, no tangential thoughts, no hallucinations   or delusions  His affect is normal  RESP: No cough, no audible wheezing, able to talk in full sentences  Remainder of exam unable to be completed due to telephone visits    Assessment/Plan  62 yo male with lumbar ddd, disc herniations, anterolisthesis, radiculopathy, worse    I independently reviewed the following imaging studies and discussed with patient:  Lumbar MRI; shows ddd, disc herniations  Discussed and ordered lumbar TANYA  Cont. HEP  Rx given for medrol pack  Followup after TANYA          Phone call duration: 20 minutes  Phone call start: 405pm  Phone call end: 425pm  Dr Montemayor

## 2024-01-18 NOTE — LETTER
1/18/2024       RE: Steve Jin  [address]  [place] [address]     Dear Colleague,    Thank you for referring your patient, Steve Jin, to the [facility] [place] at [facility]. Please see a copy of my visit note below.    Patient is a   63  year old who is being evaluated via a billable telephone visit.      What phone number would you like to be contacted at? CELL  How would you like to obtain your AVS? MYCHART        Subjective   Patient is a   63  year old who presents by phone call visit for the following:     HPI   Followup for lumbar MRI  Continues to have low back pain and radiation into legs  Wants to discuss MRI    Review of Systems   Constitutional, HEENT, cardiovascular, pulmonary, gi and gu systems are negative, except as otherwise noted.      Objective           Vitals:  No vitals were obtained today due to virtual visit.    Physical Exam   healthy, alert, and no distress  PSYCH: Alert and oriented times 3; coherent speech, normal   rate and volume, able to articulate logical thoughts, able   to abstract reason, no tangential thoughts, no hallucinations   or delusions  His affect is normal  RESP: No cough, no audible wheezing, able to talk in full sentences  Remainder of exam unable to be completed due to telephone visits    Assessment/Plan  62 yo male with lumbar ddd, disc herniations, anterolisthesis, radiculopathy, worse    I independently reviewed the following imaging studies and discussed with patient:  Lumbar MRI; shows ddd, disc herniations  Discussed and ordered lumbar TANYA  Cont. HEP  Rx given for medrol pack  Followup after TANYA          Phone call duration: 20 minutes  Phone call start: 405pm  Phone call end: 425pm  Dr Montemayor       Again, thank you for allowing me to participate in the care of your patient.      Sincerely,    Yvon Montemayor MD

## 2024-01-23 ENCOUNTER — OFFICE VISIT (OUTPATIENT)
Dept: FAMILY MEDICINE | Facility: CLINIC | Age: 64
End: 2024-01-23
Payer: COMMERCIAL

## 2024-01-23 DIAGNOSIS — D22.9 MULTIPLE BENIGN NEVI: Primary | ICD-10-CM

## 2024-01-23 DIAGNOSIS — L30.9 ECZEMA, UNSPECIFIED TYPE: ICD-10-CM

## 2024-01-23 DIAGNOSIS — T69.1XXA CHILBLAINS, INITIAL ENCOUNTER: ICD-10-CM

## 2024-01-23 DIAGNOSIS — L81.4 LENTIGINES: ICD-10-CM

## 2024-01-23 DIAGNOSIS — D18.01 CHERRY ANGIOMA: ICD-10-CM

## 2024-01-23 DIAGNOSIS — Z85.828 HISTORY OF NONMELANOMA SKIN CANCER: ICD-10-CM

## 2024-01-23 DIAGNOSIS — L82.1 SEBORRHEIC KERATOSIS: ICD-10-CM

## 2024-01-23 PROCEDURE — 99214 OFFICE O/P EST MOD 30 MIN: CPT | Performed by: DERMATOLOGY

## 2024-01-23 RX ORDER — CLOBETASOL PROPIONATE 0.5 MG/G
OINTMENT TOPICAL 2 TIMES DAILY
Qty: 60 G | Refills: 3 | Status: SHIPPED | OUTPATIENT
Start: 2024-01-23

## 2024-01-23 ASSESSMENT — PAIN SCALES - GENERAL: PAINLEVEL: NO PAIN (0)

## 2024-01-23 NOTE — LETTER
1/23/2024         RE: Steve Jin  [address]  [place] [address]        Dear Colleague,    Thank you for referring your patient, Steve Jin, to the [facility]. Please see a copy of my visit note below.    [facility] Dermatology Note  Encounter Date: Jan 23, 2024  Office Visit     Dermatology Problem List:  Last skin check 01/23/24  # nBCC, s/p outside [facility] 2023  1. Seborrheic dermatitis.  - Ketoconazole shampoo  2. Eczema.  - TMC 0.1% ointment  3. AKs.  - History of Efudex use  4. Pernio?  - clobetasol    # Lesion to monitor, right upper chest, suspect inflammatory    ____________________________________________    Assessment & Plan:    # Blueish discoloration of toes. Unclear etiology. Will treat as pernio. DDx includes Raynauds.  - Start clobetasol 0.05% ointment BID prn to feet to see if helps.  - Reviewed CBC from march 2023.     # Lesion to monitor, right upper chest, suspect inflammatory.  - Notify if does not resolve.      # Dermatitis, left hip.  - Advised daily moisturization with bland emollient.     # Pink papules on lower chest/upper abdomen, nonspecific, possibly seb derm v folliculitis v phillip's.  - Advised daily moisturization with bland emollient.   - trial ketoconazole shampoo as body wash  - if not improving, try BP wash daily in shower prn     # Benign lesions - SKs, cherry angiomas, lentigenes.  - No treatment required    # Multiple benign nevi.   - Monitor for ABCDEs of melanoma   - Continue sun protection - recommend SPF 30 or higher with frequent application   - Return sooner if noticing changing or symptomatic lesions    # History of NMSC. No evidence of recurrent disease.  - Continue photoprotection - recommend SPF 30 or higher with frequent reapplication  - Continue yearly skin exams  - Advised to monitor for changing, non-healing, bleeding, painful, changing, or otherwise symptomatic lesions      Procedures  "Performed:   None.    Follow-up: 1 year(s) in-person, or earlier for new or changing lesions    Staff and Scribe:     Scribe Disclosure:   I, KONG EPPS, am serving as a scribe; to document services personally performed by Alejandra Acevedo MD -based on data collection and the provider's statements to me.    Provider Disclosure:   The documentation recorded by the scribe accurately reflects the services I personally performed and the decisions made by me.    Alejandra Acevedo MD    Department of Dermatology  [facility]: Phone: [phone], Fax: [phone]  1/25/2024     ____________________________________________    CC: Skin Check    HPI:  Mr. Steve Jin is a(n) 63 year old male who presents today as a return patient for FBSE.    The patient had a cold sore about 1 week ago.    He presents with some dry skin.    He has some toe sensitivity. He recently went skiing (about 20 min prior to this visit), though he does not believe is a factor as he has had this issue off/on for years. Has always had \"sensitive\" hands and toes for about 15+ years. Symptoms include cold hands and sometimes turns red or blue. Non smoker.    Patient is otherwise feeling well, without additional skin concerns.    Labs Reviewed:  N/A    Physical Exam:  Vitals: There were no vitals taken for this visit.  SKIN: Total skin excluding the undergarment areas was performed. The exam included the head/face, neck, both arms, chest, back, abdomen, both legs, digits and/or nails.   - There are dome shaped bright red papules on the trunk and extremities.  - Multiple regular brown pigmented macules and papules are identified on the trunk and extremities. .   - Scattered brown macules on sun exposed areas.  - Waxy stuck on papules and plaques on trunk and extremities.   - Central chest, several non specific pink papules  - left hip, xerotic " patch.  - Left 3rd to 5th distal toes with erythema and bluish discoloration. Cool to touch. Similar on right 4th-5th toe. Improved throughout visit.  - No other lesions of concern on areas examined.     Medications:  Current Outpatient Medications   Medication     atorvastatin (LIPITOR) 40 MG tablet     diazepam (VALIUM) 5 MG tablet     etodolac (LODINE) 500 MG tablet     gabapentin (NEURONTIN) 100 MG capsule     ibuprofen (ADVIL/MOTRIN) 200 MG tablet     ketoconazole (NIZORAL) 2 % external shampoo     levothyroxine (SYNTHROID/LEVOTHROID) 112 MCG tablet     methylPREDNISolone (MEDROL DOSEPAK) 4 MG tablet therapy pack     methylPREDNISolone (MEDROL DOSEPAK) 4 MG tablet therapy pack     No current facility-administered medications for this visit.      Past Medical History:   Patient Active Problem List   Diagnosis     Hypothyroidism, unspecified type     Hyperlipidemia LDL goal <70     CAD (coronary artery disease)     Tendinopathy of rotator cuff     Tear of right glenoid labrum     Syncope and collapse     Subjective tinnitus of right ear     Situational anxiety     Sensorineural hearing loss, asymmetrical     Sebaceous cyst     Primary osteoarthritis of left knee     Personal history of alcoholism (H)     Other and unspecified special symptom or syndrome, not elsewhere classified     Nasal septal deviation     Low bone mass     Hypertrophy of inferior nasal turbinate     Hearing loss in right ear     Environmental allergies     Closed fracture of left distal fibula     Chronic sinusitis     Biceps tendinopathy of right upper extremity     Backache     Pes cavus     Past Medical History:   Diagnosis Date     Actinic keratosis      Basal cell carcinoma      Coronary artery disease      Hepatitis C     S/p pegasys and rebatol tx in early 2000s     Hepatitis C      Hyperlipidemia      Hyperlipidemia LDL goal <70      Hyperlipidemia LDL goal <70 12/04/2020     Hypothyroid      Hypothyroidism      Osteopenia      RBBB       Sensorineural hearing loss, unilateral     Right ear     Situational anxiety     Flying     Syncope         CC No referring provider defined for this encounter. on close of this encounter.      Again, thank you for allowing me to participate in the care of your patient.        Sincerely,        Alejandra Acevedo MD

## 2024-01-23 NOTE — PATIENT INSTRUCTIONS
Spots on stomach   - daily moisturizer  - try ketoconazole shampoo as body wash  - if not improving, can try benzoyl peroxide 4-5% wash daily in shower. This can be purchased over the counter at [employer] or [employer] ([employer] makes this product). It can be found in a purple tube in the acne aisle. Be sure to rinse thoroughly with use as it can bleach towels and clothing.       Spot on hip  - try a moisturizer      Blue toes  - maybe pernio? (Aka chilblains). Try clobetasol ointment twice daily as needed  - otherwise maybe on spectrum of raynaud's?

## 2024-01-23 NOTE — PROGRESS NOTES
[facility] Dermatology Note  Encounter Date: Jan 23, 2024  Office Visit     Dermatology Problem List:  Last skin check 01/23/24  # nBCC, s/p outside [facility] 2023  1. Seborrheic dermatitis.  - Ketoconazole shampoo  2. Eczema.  - TMC 0.1% ointment  3. AKs.  - History of Efudex use  4. Pernio?  - clobetasol    # Lesion to monitor, right upper chest, suspect inflammatory    ____________________________________________    Assessment & Plan:    # Blueish discoloration of toes. Unclear etiology. Will treat as pernio. DDx includes Raynauds.  - Start clobetasol 0.05% ointment BID prn to feet to see if helps.  - Reviewed CBC from march 2023.     # Lesion to monitor, right upper chest, suspect inflammatory.  - Notify if does not resolve.      # Dermatitis, left hip.  - Advised daily moisturization with bland emollient.     # Pink papules on lower chest/upper abdomen, nonspecific, possibly seb derm v folliculitis v phillip's.  - Advised daily moisturization with bland emollient.   - trial ketoconazole shampoo as body wash  - if not improving, try BP wash daily in shower prn     # Benign lesions - SKs, cherry angiomas, lentigenes.  - No treatment required    # Multiple benign nevi.   - Monitor for ABCDEs of melanoma   - Continue sun protection - recommend SPF 30 or higher with frequent application   - Return sooner if noticing changing or symptomatic lesions    # History of NMSC. No evidence of recurrent disease.  - Continue photoprotection - recommend SPF 30 or higher with frequent reapplication  - Continue yearly skin exams  - Advised to monitor for changing, non-healing, bleeding, painful, changing, or otherwise symptomatic lesions      Procedures Performed:   None.    Follow-up: 1 year(s) in-person, or earlier for new or changing lesions    Staff and Scribe:     Scribe Disclosure:   KONG BURNS, am serving as a scribe; to document services personally performed by Alejandra Acevedo MD -based on data  "collection and the provider's statements to me.    Provider Disclosure:   The documentation recorded by the scribe accurately reflects the services I personally performed and the decisions made by me.    Alejandra Acevedo MD    Department of Dermatology  [facility]: Phone: [phone], Fax: [phone]  1/25/2024     ____________________________________________    CC: Skin Check    HPI:  Mr. Steve Jin is a(n) 63 year old male who presents today as a return patient for FBSE.    The patient had a cold sore about 1 week ago.    He presents with some dry skin.    He has some toe sensitivity. He recently went skiing (about 20 min prior to this visit), though he does not believe is a factor as he has had this issue off/on for years. Has always had \"sensitive\" hands and toes for about 15+ years. Symptoms include cold hands and sometimes turns red or blue. Non smoker.    Patient is otherwise feeling well, without additional skin concerns.    Labs Reviewed:  N/A    Physical Exam:  Vitals: There were no vitals taken for this visit.  SKIN: Total skin excluding the undergarment areas was performed. The exam included the head/face, neck, both arms, chest, back, abdomen, both legs, digits and/or nails.   - There are dome shaped bright red papules on the trunk and extremities.  - Multiple regular brown pigmented macules and papules are identified on the trunk and extremities. .   - Scattered brown macules on sun exposed areas.  - Waxy stuck on papules and plaques on trunk and extremities.   - Central chest, several non specific pink papules  - left hip, xerotic patch.  - Left 3rd to 5th distal toes with erythema and bluish discoloration. Cool to touch. Similar on right 4th-5th toe. Improved throughout visit.  - No other lesions of concern on areas examined.     Medications:  Current Outpatient Medications   Medication    " atorvastatin (LIPITOR) 40 MG tablet    diazepam (VALIUM) 5 MG tablet    etodolac (LODINE) 500 MG tablet    gabapentin (NEURONTIN) 100 MG capsule    ibuprofen (ADVIL/MOTRIN) 200 MG tablet    ketoconazole (NIZORAL) 2 % external shampoo    levothyroxine (SYNTHROID/LEVOTHROID) 112 MCG tablet    methylPREDNISolone (MEDROL DOSEPAK) 4 MG tablet therapy pack    methylPREDNISolone (MEDROL DOSEPAK) 4 MG tablet therapy pack     No current facility-administered medications for this visit.      Past Medical History:   Patient Active Problem List   Diagnosis    Hypothyroidism, unspecified type    Hyperlipidemia LDL goal <70    CAD (coronary artery disease)    Tendinopathy of rotator cuff    Tear of right glenoid labrum    Syncope and collapse    Subjective tinnitus of right ear    Situational anxiety    Sensorineural hearing loss, asymmetrical    Sebaceous cyst    Primary osteoarthritis of left knee    Personal history of alcoholism (H)    Other and unspecified special symptom or syndrome, not elsewhere classified    Nasal septal deviation    Low bone mass    Hypertrophy of inferior nasal turbinate    Hearing loss in right ear    Environmental allergies    Closed fracture of left distal fibula    Chronic sinusitis    Biceps tendinopathy of right upper extremity    Backache    Pes cavus     Past Medical History:   Diagnosis Date    Actinic keratosis     Basal cell carcinoma     Coronary artery disease     Hepatitis C     S/p pegasys and rebatol tx in early 2000s    Hepatitis C     Hyperlipidemia     Hyperlipidemia LDL goal <70     Hyperlipidemia LDL goal <70 12/04/2020    Hypothyroid     Hypothyroidism     Osteopenia     RBBB     Sensorineural hearing loss, unilateral     Right ear    Situational anxiety     Flying    Syncope         CC No referring provider defined for this encounter. on close of this encounter.

## 2024-01-26 ENCOUNTER — TRANSFERRED RECORDS (OUTPATIENT)
Dept: HEALTH INFORMATION MANAGEMENT | Facility: CLINIC | Age: 64
End: 2024-01-26

## 2024-03-05 ENCOUNTER — OFFICE VISIT (OUTPATIENT)
Dept: ORTHOPEDICS | Facility: CLINIC | Age: 64
End: 2024-03-05
Payer: COMMERCIAL

## 2024-03-05 VITALS — WEIGHT: 159 LBS | HEIGHT: 70 IN | BODY MASS INDEX: 22.76 KG/M2

## 2024-03-05 DIAGNOSIS — M51.16 LUMBAR DISC HERNIATION WITH RADICULOPATHY: Primary | ICD-10-CM

## 2024-03-05 PROCEDURE — 99214 OFFICE O/P EST MOD 30 MIN: CPT | Performed by: PREVENTIVE MEDICINE

## 2024-03-05 RX ORDER — MELOXICAM 15 MG/1
15 TABLET ORAL DAILY PRN
Qty: 90 TABLET | Refills: 0 | Status: SHIPPED | OUTPATIENT
Start: 2024-03-05

## 2024-03-05 NOTE — PROGRESS NOTES
HISTORY OF PRESENT ILLNESS  Mr. Jin is a pleasant 63 year old year old male who presents to clinic today with the following:    What problem are you here for: Follow up after his lumbar TANYA on 1/26/2024. He reports his lumbar injection only provide mild decrease to his pain (10-15%). He reports that he will continue to have the same symptoms (sciatic pain in his left leg), with activities such kayaking, tennis, pickle ball, cross country skiing.  He states his left leg feels weaker and his hips are not as flexible.     How long have you had this problem: Over one year.     Have you had any recent imaging of this problem? Xrays/MRI/CT scans:  - XR of lumbar spine and bilateral hips completed on 12/28/2023  - MRI of lumbar spine completed on 1/15/2024    Have you had treatments for this problem in the past?  -Medications: he completed his medrol pack and reports this medication was highly effective with treating his pain.   -Physical therapy: No formal physical therapy.   -Injections: [facility]  -Surgery: None     How severe is this problem today? 0-10 scale: 2/10    How severe has this problem been at WORST in the past? 0-10 scale: 4-5/10    Does this problem or its symptoms cause difficulty for you falling asleep or staying asleep: No     MEDICAL HISTORY  Patient Active Problem List   Diagnosis    Hypothyroidism, unspecified type    Hyperlipidemia LDL goal <70    CAD (coronary artery disease)    Tendinopathy of rotator cuff    Tear of right glenoid labrum    Syncope and collapse    Subjective tinnitus of right ear    Situational anxiety    Sensorineural hearing loss, asymmetrical    Sebaceous cyst    Primary osteoarthritis of left knee    Personal history of alcoholism (H)    Other and unspecified special symptom or syndrome, not elsewhere classified    Nasal septal deviation    Low bone mass    Hypertrophy of inferior nasal turbinate    Hearing loss in right ear     Environmental allergies    Closed fracture of left distal fibula    Chronic sinusitis    Biceps tendinopathy of right upper extremity    Backache    Pes cavus       Current Outpatient Medications   Medication Sig Dispense Refill    atorvastatin (LIPITOR) 40 MG tablet TAKE 1 TABLET BY MOUTH EVERY DAY 90 tablet 1    clobetasol (TEMOVATE) 0.05 % external ointment Apply topically 2 times daily To feet as needed 60 g 3    diazepam (VALIUM) 5 MG tablet Take 1 tablet (5 mg) by mouth once as needed for anxiety (take one hour prior to flight on airplane) 6 tablet 0    etodolac (LODINE) 500 MG tablet Take 1 tablet (500 mg) by mouth 2 times daily as needed (lumbar) 60 tablet 0    gabapentin (NEURONTIN) 100 MG capsule Take 1 capsule (100 mg) by mouth 3 times daily 90 capsule 3    ibuprofen (ADVIL/MOTRIN) 200 MG tablet Take 200-400 mg by mouth       ketoconazole (NIZORAL) 2 % external shampoo Massage into wet scalp, let sit 3-5 min, then rinse. Do this 3x weekly. (Patient not taking: Reported on 1/23/2024) 120 mL 11    levothyroxine (SYNTHROID/LEVOTHROID) 112 MCG tablet TAKE 1 TABLET(112 MCG) BY MOUTH DAILY 90 tablet 0    methylPREDNISolone (MEDROL DOSEPAK) 4 MG tablet therapy pack Follow Package Directions (Patient not taking: Reported on 1/23/2024) 21 tablet 0    methylPREDNISolone (MEDROL DOSEPAK) 4 MG tablet therapy pack Follow Package Directions (Patient not taking: Reported on 1/23/2024) 21 tablet 0       Allergies   Allergen Reactions    Sulfa Antibiotics Rash    Sulfanilamide Rash     Doctor believes he was allergic to topical ointment as a teenager ?sulfa containing       Hydrocodone-Acetaminophen Other (See Comments)    No Clinical Screening - See Comments      PN: LW CM1: CONTRAST- nka Reaction :       Family History   Problem Relation Age of Onset    Hypothyroidism Sister     Cancer Sister     Hypothyroidism Brother     Cerebrovascular Disease No family hx of     Diabetes No family hx of     Colon Cancer No family hx  "of     Heart Disease No family hx of     Hypertension No family hx of     Valvular heart disease Mother     Sudden Death Brother      Social History     Socioeconomic History    Marital status:    Tobacco Use    Smoking status: Former     Packs/day: 1.00     Years: 20.00     Additional pack years: 0.00     Total pack years: 20.00     Types: Cigarettes     Quit date:      Years since quittin.1    Smokeless tobacco: Never   Vaping Use    Vaping Use: Never used   Substance and Sexual Activity    Alcohol use: Not Currently    Drug use: Not Currently       Additional medical/Social/Surgical histories reviewed in [facility] and updated as appropriate.     REVIEW OF SYSTEMS (3/5/2024)  10 point ROS of systems including Constitutional, Eyes, Respiratory, Cardiovascular, Gastroenterology, Genitourinary, Integumentary, Musculoskeletal, Psychiatric, Allergic/Immunologic were all negative except for pertinent positives noted in my HPI.     PHYSICAL EXAM  VSS  Vital Signs: Ht 1.765 m (5' 9.5\")   Wt 72.1 kg (159 lb)   BMI 23.14 kg/m   Patient declined being weighed. Body mass index is 23.14 kg/m .    General  - normal appearance, in no obvious distress  HEENT  - conjunctivae not injected, moist mucous membranes, normocephalic/atraumatic head, ears normal appearance, no lesions, mouth normal appearance, no scars, normal dentition and teeth present  CV  - normal peripheral perfusion  Pulm  - normal respiratory pattern, non-labored  Musculoskeletal - lumbar spine  - stance: normal gait without limp, no obvious leg length discrepancy, normal heel and toe walk  - inspection: normal bone and joint alignment, no obvious scoliosis  - palpation: no paravertebral or bony tenderness  - ROM: flexion exacerbates pain, normal extension, sidebending, rotation  - strength: lower extremities 5/5 in all planes  - special tests:  (+) straight leg raise- right  (+) slump test  Neuro  - patellar and Achilles DTRs 2+ bilaterally, lower " extremity sensory deficit throughout L5 distribution, grossly normal coordination, normal muscle tone  Skin  - no ecchymosis, erythema, warmth, or induration, no obvious rash  Psych  - interactive, appropriate, normal mood and affect    ASSESSMENT & PLAN  62 yo male with lumbar radiculopathy, lumbar ddd, not resolved    I independently reviewed the following imaging studies:  Lumbar MRI shows ddd, disc herniations  Discussed and ordered mobic PRN  Followup PRN   For further treatments  Patient has been doing home exercise physical therapy program for this problem      Appropriate PPE was utilized for prevention of spread of Covid-19.  Yvon Montemayor MD, CAQSM

## 2024-03-05 NOTE — LETTER
3/5/2024      RE: Steve Jin  [address]  [place] [address]     Dear Colleague,    Thank you for referring your patient, Steve Jin, to the [facility] [place]. Please see a copy of my visit note below.    HISTORY OF PRESENT ILLNESS  Mr. Jin is a pleasant 63 year old year old male who presents to clinic today with the following:    What problem are you here for: Follow up after his lumbar TANYA on 1/26/2024. He reports his lumbar injection only provide mild decrease to his pain (10-15%). He reports that he will continue to have the same symptoms (sciatic pain in his left leg), with activities such kayaking, tennis, pickle ball, cross country skiing.  He states his left leg feels weaker and his hips are not as flexible.     How long have you had this problem: Over one year.     Have you had any recent imaging of this problem? Xrays/MRI/CT scans:  - XR of lumbar spine and bilateral hips completed on 12/28/2023  - MRI of lumbar spine completed on 1/15/2024    Have you had treatments for this problem in the past?  -Medications: he completed his medrol pack and reports this medication was highly effective with treating his pain.   -Physical therapy: No formal physical therapy.   -Injections: Lumbar TANYA completed at [facility] on 1/26/2024  -Surgery: None     How severe is this problem today? 0-10 scale: 2/10    How severe has this problem been at WORST in the past? 0-10 scale: 4-5/10    Does this problem or its symptoms cause difficulty for you falling asleep or staying asleep: No     MEDICAL HISTORY  Patient Active Problem List   Diagnosis    Hypothyroidism, unspecified type    Hyperlipidemia LDL goal <70    CAD (coronary artery disease)    Tendinopathy of rotator cuff    Tear of right glenoid labrum    Syncope and collapse    Subjective tinnitus of right ear    Situational anxiety    Sensorineural hearing loss, asymmetrical    Sebaceous cyst    Primary  osteoarthritis of left knee    Personal history of alcoholism (H)    Other and unspecified special symptom or syndrome, not elsewhere classified    Nasal septal deviation    Low bone mass    Hypertrophy of inferior nasal turbinate    Hearing loss in right ear    Environmental allergies    Closed fracture of left distal fibula    Chronic sinusitis    Biceps tendinopathy of right upper extremity    Backache    Pes cavus       Current Outpatient Medications   Medication Sig Dispense Refill    atorvastatin (LIPITOR) 40 MG tablet TAKE 1 TABLET BY MOUTH EVERY DAY 90 tablet 1    clobetasol (TEMOVATE) 0.05 % external ointment Apply topically 2 times daily To feet as needed 60 g 3    diazepam (VALIUM) 5 MG tablet Take 1 tablet (5 mg) by mouth once as needed for anxiety (take one hour prior to flight on airplane) 6 tablet 0    etodolac (LODINE) 500 MG tablet Take 1 tablet (500 mg) by mouth 2 times daily as needed (lumbar) 60 tablet 0    gabapentin (NEURONTIN) 100 MG capsule Take 1 capsule (100 mg) by mouth 3 times daily 90 capsule 3    ibuprofen (ADVIL/MOTRIN) 200 MG tablet Take 200-400 mg by mouth       ketoconazole (NIZORAL) 2 % external shampoo Massage into wet scalp, let sit 3-5 min, then rinse. Do this 3x weekly. (Patient not taking: Reported on 1/23/2024) 120 mL 11    levothyroxine (SYNTHROID/LEVOTHROID) 112 MCG tablet TAKE 1 TABLET(112 MCG) BY MOUTH DAILY 90 tablet 0    methylPREDNISolone (MEDROL DOSEPAK) 4 MG tablet therapy pack Follow Package Directions (Patient not taking: Reported on 1/23/2024) 21 tablet 0    methylPREDNISolone (MEDROL DOSEPAK) 4 MG tablet therapy pack Follow Package Directions (Patient not taking: Reported on 1/23/2024) 21 tablet 0       Allergies   Allergen Reactions    Sulfa Antibiotics Rash    Sulfanilamide Rash     Doctor believes he was allergic to topical ointment as a teenager ?sulfa containing       Hydrocodone-Acetaminophen Other (See Comments)    No Clinical Screening - See Comments       "PN: LW CM1: CONTRAST- nka Reaction :       Family History   Problem Relation Age of Onset    Hypothyroidism Sister     Cancer Sister     Hypothyroidism Brother     Cerebrovascular Disease No family hx of     Diabetes No family hx of     Colon Cancer No family hx of     Heart Disease No family hx of     Hypertension No family hx of     Valvular heart disease Mother     Sudden Death Brother      Social History     Socioeconomic History    Marital status:    Tobacco Use    Smoking status: Former     Packs/day: 1.00     Years: 20.00     Additional pack years: 0.00     Total pack years: 20.00     Types: Cigarettes     Quit date:      Years since quittin.1    Smokeless tobacco: Never   Vaping Use    Vaping Use: Never used   Substance and Sexual Activity    Alcohol use: Not Currently    Drug use: Not Currently       Additional medical/Social/Surgical histories reviewed in [facility] and updated as appropriate.     REVIEW OF SYSTEMS (3/5/2024)  10 point ROS of systems including Constitutional, Eyes, Respiratory, Cardiovascular, Gastroenterology, Genitourinary, Integumentary, Musculoskeletal, Psychiatric, Allergic/Immunologic were all negative except for pertinent positives noted in my HPI.     PHYSICAL EXAM  VSS  Vital Signs: Ht 1.765 m (5' 9.5\")   Wt 72.1 kg (159 lb)   BMI 23.14 kg/m   Patient declined being weighed. Body mass index is 23.14 kg/m .    General  - normal appearance, in no obvious distress  HEENT  - conjunctivae not injected, moist mucous membranes, normocephalic/atraumatic head, ears normal appearance, no lesions, mouth normal appearance, no scars, normal dentition and teeth present  CV  - normal peripheral perfusion  Pulm  - normal respiratory pattern, non-labored  Musculoskeletal - lumbar spine  - stance: normal gait without limp, no obvious leg length discrepancy, normal heel and toe walk  - inspection: normal bone and joint alignment, no obvious scoliosis  - palpation: no paravertebral or " bony tenderness  - ROM: flexion exacerbates pain, normal extension, sidebending, rotation  - strength: lower extremities 5/5 in all planes  - special tests:  (+) straight leg raise- right  (+) slump test  Neuro  - patellar and Achilles DTRs 2+ bilaterally, lower extremity sensory deficit throughout L5 distribution, grossly normal coordination, normal muscle tone  Skin  - no ecchymosis, erythema, warmth, or induration, no obvious rash  Psych  - interactive, appropriate, normal mood and affect    ASSESSMENT & PLAN  64 yo male with lumbar radiculopathy, lumbar ddd, not resolved    I independently reviewed the following imaging studies:  Lumbar MRI shows ddd, disc herniations  Discussed and ordered mobic PRN  Followup PRN   For further treatments  Patient has been doing home exercise physical therapy program for this problem      Appropriate PPE was utilized for prevention of spread of Covid-19.  Yvon Montemayor MD, [facility]      Again, thank you for allowing me to participate in the care of your patient.      Sincerely,    Yvon Montemayor MD

## 2024-03-27 ENCOUNTER — TELEPHONE (OUTPATIENT)
Dept: ORTHOPEDICS | Facility: CLINIC | Age: 64
End: 2024-03-27
Payer: COMMERCIAL

## 2024-03-27 NOTE — TELEPHONE ENCOUNTER
Other: Shana from [facility] radiology called would like for care team to call her back in regards to get clarification on order. Order received was for sacrol injection. Wants to clarify that it's not SI joint injection. [phone]     Could we send this information to you in [employer] or would you prefer to receive a phone call?:   Patient would prefer a phone call   Okay to leave a detailed message?: No at Other phone number:  [phone]     No

## 2024-03-27 NOTE — TELEPHONE ENCOUNTER
AGUSTIN spoke with Yuliana at [facility] and confirmed the patient's lumbar TANYA scheduled for 4/4/2024 is for L5/S1 bilateral lumbar facet joint per Dr. Montemayor's review of call.     She appreciated the return call and had no further questions.    AGUSTIN Caldera

## 2025-02-22 ENCOUNTER — HEALTH MAINTENANCE LETTER (OUTPATIENT)
Age: 65
End: 2025-02-22

## 2025-04-26 NOTE — NURSING NOTE
[employer]IR[employer] / CAM WALKING BOOT INSTRUCTIONS  - Do NOT drive with CAM walker on. This is due to safety and legal issues.   - Do NOT wear the CAM walker on long car/train rides or on an airplane.  - Remove the CAM walker several times a day and do ankle range of motion (ROM) exercises/wiggle toes.  - It is recommended that a thick-soled shoe be worn on the other foot to offset any created leg length issue.   - The boot does not have to be worn at night.   - There is an increased risk of developing a blood clot with lower extremity immobilization. ROM exercises and knee-high compression (tenso /ACE wrap) is recommended to lower that risk.   - You should seek medical attention if you experience calf swelling and/or pain, chest pain, or shortness of breath.        TRANSFER - OUT REPORT:    Verbal report given to Aileen SNOWDEN on Taj Clemens  being transferred to [facility] for routine progression of patient care       Report consisted of patient's Situation, Background, Assessment and   Recommendations(SBAR).     Information from the following report(s) Nurse Handoff Report, ED SBAR, MAR, and Recent Results was reviewed with the receiving nurse.    [place] Fall Assessment:    Presents to emergency department  because of falls (Syncope, seizure, or loss of consciousness): Yes  Age > 70: No  Altered Mental Status, Intoxication with alcohol or substance confusion (Disorientation, impaired judgment, poor safety awaremess, or inability to follow instructions): No  Impaired Mobility: Ambulates or transfers with assistive devices or assistance; Unable to ambulate or transer.: Yes  Nursing Judgement: Yes          Lines:   Peripheral IV 04/25/25 Left Antecubital (Active)        Opportunity for questions and clarification was provided.      Patient transported with:  Monitor and Tech